# Patient Record
Sex: FEMALE | Race: WHITE | NOT HISPANIC OR LATINO
[De-identification: names, ages, dates, MRNs, and addresses within clinical notes are randomized per-mention and may not be internally consistent; named-entity substitution may affect disease eponyms.]

---

## 2017-01-06 ENCOUNTER — MEDICATION RENEWAL (OUTPATIENT)
Age: 52
End: 2017-01-06

## 2017-01-06 DIAGNOSIS — J06.9 ACUTE UPPER RESPIRATORY INFECTION, UNSPECIFIED: ICD-10-CM

## 2017-01-29 ENCOUNTER — MEDICATION RENEWAL (OUTPATIENT)
Age: 52
End: 2017-01-29

## 2017-01-30 ENCOUNTER — MEDICATION RENEWAL (OUTPATIENT)
Age: 52
End: 2017-01-30

## 2017-03-13 ENCOUNTER — MEDICATION RENEWAL (OUTPATIENT)
Age: 52
End: 2017-03-13

## 2017-03-25 ENCOUNTER — MEDICATION RENEWAL (OUTPATIENT)
Age: 52
End: 2017-03-25

## 2017-04-06 ENCOUNTER — MEDICATION RENEWAL (OUTPATIENT)
Age: 52
End: 2017-04-06

## 2017-05-07 ENCOUNTER — RESULT REVIEW (OUTPATIENT)
Age: 52
End: 2017-05-07

## 2017-05-08 ENCOUNTER — OTHER (OUTPATIENT)
Age: 52
End: 2017-05-08

## 2017-05-18 ENCOUNTER — APPOINTMENT (OUTPATIENT)
Dept: DERMATOLOGY | Facility: CLINIC | Age: 52
End: 2017-05-18

## 2017-05-18 VITALS — SYSTOLIC BLOOD PRESSURE: 128 MMHG | DIASTOLIC BLOOD PRESSURE: 70 MMHG

## 2017-05-31 ENCOUNTER — MEDICATION RENEWAL (OUTPATIENT)
Age: 52
End: 2017-05-31

## 2017-07-02 ENCOUNTER — MOBILE ON CALL (OUTPATIENT)
Age: 52
End: 2017-07-02

## 2017-07-03 ENCOUNTER — MEDICATION RENEWAL (OUTPATIENT)
Age: 52
End: 2017-07-03

## 2017-07-03 DIAGNOSIS — Z86.19 PERSONAL HISTORY OF OTHER INFECTIOUS AND PARASITIC DISEASES: ICD-10-CM

## 2017-08-04 ENCOUNTER — APPOINTMENT (OUTPATIENT)
Dept: INTERNAL MEDICINE | Facility: CLINIC | Age: 52
End: 2017-08-04
Payer: COMMERCIAL

## 2017-08-04 ENCOUNTER — NON-APPOINTMENT (OUTPATIENT)
Age: 52
End: 2017-08-04

## 2017-08-04 VITALS
TEMPERATURE: 98.4 F | WEIGHT: 166 LBS | OXYGEN SATURATION: 97 % | HEIGHT: 61 IN | SYSTOLIC BLOOD PRESSURE: 120 MMHG | HEART RATE: 73 BPM | BODY MASS INDEX: 31.34 KG/M2 | DIASTOLIC BLOOD PRESSURE: 64 MMHG

## 2017-08-04 DIAGNOSIS — Z81.8 FAMILY HISTORY OF OTHER MENTAL AND BEHAVIORAL DISORDERS: ICD-10-CM

## 2017-08-04 DIAGNOSIS — Z78.9 OTHER SPECIFIED HEALTH STATUS: ICD-10-CM

## 2017-08-04 PROCEDURE — 99386 PREV VISIT NEW AGE 40-64: CPT | Mod: 25

## 2017-08-04 PROCEDURE — 36415 COLL VENOUS BLD VENIPUNCTURE: CPT

## 2017-08-04 PROCEDURE — 93000 ELECTROCARDIOGRAM COMPLETE: CPT

## 2017-08-04 RX ORDER — ONDANSETRON 4 MG/1
4 TABLET ORAL 3 TIMES DAILY
Qty: 30 | Refills: 3 | Status: DISCONTINUED | COMMUNITY
Start: 2017-03-25 | End: 2017-08-04

## 2017-08-04 RX ORDER — GUAIFENESIN AND CODEINE PHOSPHATE 10; 100 MG/5ML; MG/5ML
100-10 SOLUTION ORAL
Qty: 120 | Refills: 0 | Status: DISCONTINUED | COMMUNITY
Start: 2017-01-06 | End: 2017-08-04

## 2017-08-09 LAB
ALBUMIN SERPL ELPH-MCNC: 4.2 G/DL
ALP BLD-CCNC: 76 U/L
ALT SERPL-CCNC: 26 U/L
ANION GAP SERPL CALC-SCNC: 17 MMOL/L
AST SERPL-CCNC: 24 U/L
BASOPHILS # BLD AUTO: 0.03 K/UL
BASOPHILS NFR BLD AUTO: 0.4 %
BILIRUB SERPL-MCNC: 0.2 MG/DL
BUN SERPL-MCNC: 16 MG/DL
CALCIUM SERPL-MCNC: 9.7 MG/DL
CHLORIDE SERPL-SCNC: 102 MMOL/L
CHOLEST SERPL-MCNC: 232 MG/DL
CHOLEST/HDLC SERPL: 3.9 RATIO
CO2 SERPL-SCNC: 22 MMOL/L
CREAT SERPL-MCNC: 0.64 MG/DL
EOSINOPHIL # BLD AUTO: 0.08 K/UL
EOSINOPHIL NFR BLD AUTO: 1.1 %
GLUCOSE SERPL-MCNC: 108 MG/DL
HBA1C MFR BLD HPLC: 6 %
HCT VFR BLD CALC: 40.7 %
HDLC SERPL-MCNC: 59 MG/DL
HGB BLD-MCNC: 13.5 G/DL
IMM GRANULOCYTES NFR BLD AUTO: 0 %
LDLC SERPL CALC-MCNC: 158 MG/DL
LYMPHOCYTES # BLD AUTO: 2.76 K/UL
LYMPHOCYTES NFR BLD AUTO: 38.7 %
MAN DIFF?: NORMAL
MCHC RBC-ENTMCNC: 28.7 PG
MCHC RBC-ENTMCNC: 33.2 GM/DL
MCV RBC AUTO: 86.4 FL
MONOCYTES # BLD AUTO: 0.39 K/UL
MONOCYTES NFR BLD AUTO: 5.5 %
NEUTROPHILS # BLD AUTO: 3.87 K/UL
NEUTROPHILS NFR BLD AUTO: 54.3 %
PLATELET # BLD AUTO: 274 K/UL
POTASSIUM SERPL-SCNC: 4.5 MMOL/L
PROT SERPL-MCNC: 7.3 G/DL
RBC # BLD: 4.71 M/UL
RBC # FLD: 13.9 %
SODIUM SERPL-SCNC: 141 MMOL/L
TRIGL SERPL-MCNC: 74 MG/DL
TSH SERPL-ACNC: 1.04 UIU/ML
WBC # FLD AUTO: 7.13 K/UL

## 2017-08-18 ENCOUNTER — RX RENEWAL (OUTPATIENT)
Age: 52
End: 2017-08-18

## 2017-08-21 ENCOUNTER — MEDICATION RENEWAL (OUTPATIENT)
Age: 52
End: 2017-08-21

## 2017-09-10 ENCOUNTER — MEDICATION RENEWAL (OUTPATIENT)
Age: 52
End: 2017-09-10

## 2017-10-07 ENCOUNTER — MOBILE ON CALL (OUTPATIENT)
Age: 52
End: 2017-10-07

## 2017-10-11 ENCOUNTER — MEDICATION RENEWAL (OUTPATIENT)
Age: 52
End: 2017-10-11

## 2017-10-19 ENCOUNTER — NON-APPOINTMENT (OUTPATIENT)
Age: 52
End: 2017-10-19

## 2017-10-19 ENCOUNTER — APPOINTMENT (OUTPATIENT)
Dept: CARDIOLOGY | Facility: CLINIC | Age: 52
End: 2017-10-19
Payer: COMMERCIAL

## 2017-10-19 ENCOUNTER — APPOINTMENT (OUTPATIENT)
Dept: CARDIOLOGY | Facility: CLINIC | Age: 52
End: 2017-10-19

## 2017-10-19 VITALS
WEIGHT: 166 LBS | HEIGHT: 61 IN | HEART RATE: 70 BPM | DIASTOLIC BLOOD PRESSURE: 60 MMHG | BODY MASS INDEX: 31.34 KG/M2 | SYSTOLIC BLOOD PRESSURE: 104 MMHG

## 2017-10-19 DIAGNOSIS — Z82.49 FAMILY HISTORY OF ISCHEMIC HEART DISEASE AND OTHER DISEASES OF THE CIRCULATORY SYSTEM: ICD-10-CM

## 2017-10-19 PROCEDURE — 99244 OFF/OP CNSLTJ NEW/EST MOD 40: CPT

## 2017-10-19 PROCEDURE — 93000 ELECTROCARDIOGRAM COMPLETE: CPT

## 2017-10-19 PROCEDURE — 36415 COLL VENOUS BLD VENIPUNCTURE: CPT

## 2017-10-20 PROBLEM — Z82.49 FAMILY HISTORY OF PERIPHERAL ARTERIAL DISEASE: Status: ACTIVE | Noted: 2017-10-20

## 2017-10-20 LAB
CHOLEST SERPL-MCNC: 273 MG/DL
CHOLEST/HDLC SERPL: 5.7 RATIO
HDLC SERPL-MCNC: 48 MG/DL
LDLC SERPL CALC-MCNC: 199 MG/DL
LDLC SERPL DIRECT ASSAY-MCNC: 212 MG/DL
TRIGL SERPL-MCNC: 131 MG/DL

## 2017-10-22 LAB — TSH SERPL-ACNC: 1.04 UIU/ML

## 2017-10-26 PROBLEM — Z00.00 ENCOUNTER FOR PREVENTIVE HEALTH EXAMINATION: Noted: 2017-10-26

## 2017-10-28 ENCOUNTER — MOBILE ON CALL (OUTPATIENT)
Age: 52
End: 2017-10-28

## 2017-11-09 ENCOUNTER — APPOINTMENT (OUTPATIENT)
Dept: CARDIOLOGY | Facility: CLINIC | Age: 52
End: 2017-11-09
Payer: COMMERCIAL

## 2017-11-09 PROCEDURE — 93320 DOPPLER ECHO COMPLETE: CPT

## 2017-11-09 PROCEDURE — 93325 DOPPLER ECHO COLOR FLOW MAPG: CPT

## 2017-11-09 PROCEDURE — 93351 STRESS TTE COMPLETE: CPT

## 2017-11-09 PROCEDURE — 93224 XTRNL ECG REC UP TO 48 HRS: CPT

## 2017-11-13 ENCOUNTER — NON-APPOINTMENT (OUTPATIENT)
Age: 52
End: 2017-11-13

## 2017-11-20 ENCOUNTER — APPOINTMENT (OUTPATIENT)
Dept: CARDIOLOGY | Facility: CLINIC | Age: 52
End: 2017-11-20
Payer: COMMERCIAL

## 2017-11-20 VITALS
HEART RATE: 82 BPM | HEIGHT: 61 IN | WEIGHT: 167 LBS | OXYGEN SATURATION: 95 % | DIASTOLIC BLOOD PRESSURE: 71 MMHG | TEMPERATURE: 98.4 F | SYSTOLIC BLOOD PRESSURE: 109 MMHG | BODY MASS INDEX: 31.53 KG/M2

## 2017-11-20 PROCEDURE — 93000 ELECTROCARDIOGRAM COMPLETE: CPT

## 2017-11-20 PROCEDURE — 99214 OFFICE O/P EST MOD 30 MIN: CPT

## 2017-11-21 ENCOUNTER — NON-APPOINTMENT (OUTPATIENT)
Age: 52
End: 2017-11-21

## 2017-12-19 ENCOUNTER — APPOINTMENT (OUTPATIENT)
Dept: CARDIOLOGY | Facility: CLINIC | Age: 52
End: 2017-12-19
Payer: COMMERCIAL

## 2017-12-19 ENCOUNTER — NON-APPOINTMENT (OUTPATIENT)
Age: 52
End: 2017-12-19

## 2017-12-19 VITALS
BODY MASS INDEX: 31.34 KG/M2 | DIASTOLIC BLOOD PRESSURE: 69 MMHG | HEART RATE: 74 BPM | WEIGHT: 166 LBS | SYSTOLIC BLOOD PRESSURE: 109 MMHG | OXYGEN SATURATION: 95 % | HEIGHT: 61 IN

## 2017-12-19 PROCEDURE — 99214 OFFICE O/P EST MOD 30 MIN: CPT

## 2017-12-19 PROCEDURE — 93000 ELECTROCARDIOGRAM COMPLETE: CPT

## 2018-01-17 ENCOUNTER — MEDICATION RENEWAL (OUTPATIENT)
Age: 53
End: 2018-01-17

## 2018-02-02 ENCOUNTER — MEDICATION RENEWAL (OUTPATIENT)
Age: 53
End: 2018-02-02

## 2018-02-14 ENCOUNTER — APPOINTMENT (OUTPATIENT)
Dept: GASTROENTEROLOGY | Facility: CLINIC | Age: 53
End: 2018-02-14

## 2018-04-03 ENCOUNTER — APPOINTMENT (OUTPATIENT)
Dept: CARDIOLOGY | Facility: CLINIC | Age: 53
End: 2018-04-03
Payer: COMMERCIAL

## 2018-04-03 ENCOUNTER — NON-APPOINTMENT (OUTPATIENT)
Age: 53
End: 2018-04-03

## 2018-04-03 VITALS
BODY MASS INDEX: 30.04 KG/M2 | HEART RATE: 70 BPM | OXYGEN SATURATION: 9 % | WEIGHT: 159 LBS | SYSTOLIC BLOOD PRESSURE: 130 MMHG | DIASTOLIC BLOOD PRESSURE: 60 MMHG

## 2018-04-03 PROCEDURE — 99214 OFFICE O/P EST MOD 30 MIN: CPT

## 2018-04-03 PROCEDURE — 36415 COLL VENOUS BLD VENIPUNCTURE: CPT

## 2018-04-03 PROCEDURE — 93000 ELECTROCARDIOGRAM COMPLETE: CPT

## 2018-04-04 LAB
ALBUMIN SERPL ELPH-MCNC: 4.4 G/DL
ALP BLD-CCNC: 60 U/L
ALT SERPL-CCNC: 14 U/L
ANION GAP SERPL CALC-SCNC: 17 MMOL/L
AST SERPL-CCNC: 22 U/L
BILIRUB SERPL-MCNC: 0.2 MG/DL
BUN SERPL-MCNC: 20 MG/DL
CALCIUM SERPL-MCNC: 9.6 MG/DL
CHLORIDE SERPL-SCNC: 99 MMOL/L
CHOLEST SERPL-MCNC: 174 MG/DL
CHOLEST/HDLC SERPL: 3.5 RATIO
CO2 SERPL-SCNC: 23 MMOL/L
CREAT SERPL-MCNC: 0.71 MG/DL
GLUCOSE SERPL-MCNC: 127 MG/DL
HDLC SERPL-MCNC: 50 MG/DL
LDLC SERPL CALC-MCNC: 97 MG/DL
LDLC SERPL DIRECT ASSAY-MCNC: 102 MG/DL
POTASSIUM SERPL-SCNC: 3.9 MMOL/L
PROT SERPL-MCNC: 7.6 G/DL
SODIUM SERPL-SCNC: 139 MMOL/L
TRIGL SERPL-MCNC: 136 MG/DL

## 2018-04-14 ENCOUNTER — MEDICATION RENEWAL (OUTPATIENT)
Age: 53
End: 2018-04-14

## 2018-04-27 ENCOUNTER — RX RENEWAL (OUTPATIENT)
Age: 53
End: 2018-04-27

## 2018-04-30 ENCOUNTER — RX RENEWAL (OUTPATIENT)
Age: 53
End: 2018-04-30

## 2018-05-22 ENCOUNTER — CLINICAL ADVICE (OUTPATIENT)
Age: 53
End: 2018-05-22

## 2018-06-04 ENCOUNTER — APPOINTMENT (OUTPATIENT)
Dept: GASTROENTEROLOGY | Facility: CLINIC | Age: 53
End: 2018-06-04
Payer: COMMERCIAL

## 2018-06-04 VITALS
RESPIRATION RATE: 14 BRPM | HEIGHT: 61 IN | BODY MASS INDEX: 29.64 KG/M2 | HEART RATE: 74 BPM | TEMPERATURE: 97.8 F | OXYGEN SATURATION: 96 % | SYSTOLIC BLOOD PRESSURE: 118 MMHG | DIASTOLIC BLOOD PRESSURE: 64 MMHG | WEIGHT: 157 LBS

## 2018-06-04 DIAGNOSIS — Z80.0 FAMILY HISTORY OF MALIGNANT NEOPLASM OF DIGESTIVE ORGANS: ICD-10-CM

## 2018-06-04 PROCEDURE — 99243 OFF/OP CNSLTJ NEW/EST LOW 30: CPT

## 2018-06-15 ENCOUNTER — APPOINTMENT (OUTPATIENT)
Dept: GASTROENTEROLOGY | Facility: AMBULATORY MEDICAL SERVICES | Age: 53
End: 2018-06-15
Payer: COMMERCIAL

## 2018-06-15 PROCEDURE — 45380 COLONOSCOPY AND BIOPSY: CPT | Mod: 59,33

## 2018-06-15 PROCEDURE — 45385 COLONOSCOPY W/LESION REMOVAL: CPT | Mod: 33

## 2018-06-30 ENCOUNTER — MEDICATION RENEWAL (OUTPATIENT)
Age: 53
End: 2018-06-30

## 2018-07-04 ENCOUNTER — OTHER (OUTPATIENT)
Age: 53
End: 2018-07-04

## 2018-08-01 ENCOUNTER — MOBILE ON CALL (OUTPATIENT)
Age: 53
End: 2018-08-01

## 2018-08-27 ENCOUNTER — APPOINTMENT (OUTPATIENT)
Dept: ORTHOPEDIC SURGERY | Facility: CLINIC | Age: 53
End: 2018-08-27
Payer: COMMERCIAL

## 2018-08-27 VITALS
HEIGHT: 61 IN | HEART RATE: 84 BPM | BODY MASS INDEX: 29.83 KG/M2 | SYSTOLIC BLOOD PRESSURE: 112 MMHG | DIASTOLIC BLOOD PRESSURE: 72 MMHG | WEIGHT: 158 LBS

## 2018-08-27 DIAGNOSIS — Z86.39 PERSONAL HISTORY OF OTHER ENDOCRINE, NUTRITIONAL AND METABOLIC DISEASE: ICD-10-CM

## 2018-08-27 DIAGNOSIS — Z78.9 OTHER SPECIFIED HEALTH STATUS: ICD-10-CM

## 2018-08-27 PROCEDURE — 99203 OFFICE O/P NEW LOW 30 MIN: CPT

## 2018-08-27 PROCEDURE — 73030 X-RAY EXAM OF SHOULDER: CPT | Mod: LT

## 2018-09-02 ENCOUNTER — OTHER (OUTPATIENT)
Age: 53
End: 2018-09-02

## 2018-10-02 ENCOUNTER — APPOINTMENT (OUTPATIENT)
Dept: CARDIOLOGY | Facility: CLINIC | Age: 53
End: 2018-10-02

## 2018-10-05 ENCOUNTER — OTHER (OUTPATIENT)
Age: 53
End: 2018-10-05

## 2018-10-08 ENCOUNTER — MEDICATION RENEWAL (OUTPATIENT)
Age: 53
End: 2018-10-08

## 2018-10-15 ENCOUNTER — MEDICATION RENEWAL (OUTPATIENT)
Age: 53
End: 2018-10-15

## 2018-12-10 ENCOUNTER — MEDICATION RENEWAL (OUTPATIENT)
Age: 53
End: 2018-12-10

## 2018-12-12 ENCOUNTER — CXD DOCUMENT (OUTPATIENT)
Age: 53
End: 2018-12-12

## 2019-01-16 ENCOUNTER — MEDICATION RENEWAL (OUTPATIENT)
Age: 54
End: 2019-01-16

## 2019-01-21 ENCOUNTER — MEDICATION RENEWAL (OUTPATIENT)
Age: 54
End: 2019-01-21

## 2019-01-24 ENCOUNTER — OTHER (OUTPATIENT)
Age: 54
End: 2019-01-24

## 2019-01-24 DIAGNOSIS — J01.90 ACUTE SINUSITIS, UNSPECIFIED: ICD-10-CM

## 2019-01-26 PROBLEM — J01.90 ACUTE SINUSITIS: Status: RESOLVED | Noted: 2019-01-26 | Resolved: 2019-02-02

## 2019-02-15 ENCOUNTER — OTHER (OUTPATIENT)
Age: 54
End: 2019-02-15

## 2019-02-24 ENCOUNTER — MEDICATION RENEWAL (OUTPATIENT)
Age: 54
End: 2019-02-24

## 2019-04-17 ENCOUNTER — MEDICATION RENEWAL (OUTPATIENT)
Age: 54
End: 2019-04-17

## 2019-04-18 ENCOUNTER — MEDICATION RENEWAL (OUTPATIENT)
Age: 54
End: 2019-04-18

## 2019-05-17 ENCOUNTER — CLINICAL ADVICE (OUTPATIENT)
Age: 54
End: 2019-05-17

## 2019-06-07 ENCOUNTER — OTHER (OUTPATIENT)
Age: 54
End: 2019-06-07

## 2019-07-01 ENCOUNTER — MEDICATION RENEWAL (OUTPATIENT)
Age: 54
End: 2019-07-01

## 2019-07-15 ENCOUNTER — MEDICATION RENEWAL (OUTPATIENT)
Age: 54
End: 2019-07-15

## 2019-08-14 ENCOUNTER — MEDICATION RENEWAL (OUTPATIENT)
Age: 54
End: 2019-08-14

## 2019-10-01 ENCOUNTER — OTHER (OUTPATIENT)
Age: 54
End: 2019-10-01

## 2019-10-02 ENCOUNTER — MESSAGE (OUTPATIENT)
Age: 54
End: 2019-10-02

## 2019-10-04 ENCOUNTER — APPOINTMENT (OUTPATIENT)
Dept: INTERNAL MEDICINE | Facility: CLINIC | Age: 54
End: 2019-10-04
Payer: COMMERCIAL

## 2019-10-04 VITALS
SYSTOLIC BLOOD PRESSURE: 132 MMHG | OXYGEN SATURATION: 96 % | DIASTOLIC BLOOD PRESSURE: 60 MMHG | HEIGHT: 61 IN | BODY MASS INDEX: 30.58 KG/M2 | WEIGHT: 162 LBS | TEMPERATURE: 98.7 F | HEART RATE: 83 BPM

## 2019-10-04 PROCEDURE — 99396 PREV VISIT EST AGE 40-64: CPT | Mod: 25

## 2019-10-04 PROCEDURE — 36415 COLL VENOUS BLD VENIPUNCTURE: CPT

## 2019-10-04 RX ORDER — BACLOFEN 10 MG/1
10 TABLET ORAL 3 TIMES DAILY
Qty: 30 | Refills: 0 | Status: DISCONTINUED | COMMUNITY
Start: 2017-09-10 | End: 2019-10-04

## 2019-10-06 RX ORDER — MOXIFLOXACIN HYDROCHLORIDE TABLETS, 400 MG 400 MG/1
400 TABLET, FILM COATED ORAL
Qty: 7 | Refills: 0 | Status: DISCONTINUED | COMMUNITY
Start: 2019-01-26 | End: 2019-10-06

## 2019-10-06 RX ORDER — CLINDAMYCIN PHOSPHATE 10 MG/ML
1 LOTION TOPICAL
Qty: 2 | Refills: 4 | Status: DISCONTINUED | COMMUNITY
Start: 2017-05-18 | End: 2019-10-06

## 2019-10-06 RX ORDER — CHLORHEXIDINE GLUCONATE 4 G/100ML
4 SOLUTION TOPICAL
Qty: 1 | Refills: 0 | Status: DISCONTINUED | COMMUNITY
Start: 2017-05-18 | End: 2019-10-06

## 2019-10-06 RX ORDER — METHOCARBAMOL 500 MG/1
500 TABLET, FILM COATED ORAL 3 TIMES DAILY
Qty: 30 | Refills: 0 | Status: DISCONTINUED | COMMUNITY
Start: 2018-04-30 | End: 2019-10-06

## 2019-10-06 RX ORDER — CIPROFLOXACIN HYDROCHLORIDE 500 MG/1
500 TABLET, FILM COATED ORAL
Qty: 6 | Refills: 0 | Status: DISCONTINUED | COMMUNITY
Start: 2018-02-02 | End: 2019-10-06

## 2019-10-06 RX ORDER — POLYETHYLENE GLYCOL 3350, SODIUM SULFATE, SODIUM CHLORIDE, POTASSIUM CHLORIDE, ASCORBIC ACID, SODIUM ASCORBATE 7.5-2.691G
100 KIT ORAL
Qty: 1 | Refills: 0 | Status: DISCONTINUED | COMMUNITY
Start: 2018-06-04 | End: 2019-10-06

## 2019-10-06 RX ORDER — HYDROXYUREA 500 MG/1
500 CAPSULE ORAL
Qty: 60 | Refills: 3 | Status: DISCONTINUED | COMMUNITY
Start: 2017-10-11 | End: 2019-10-06

## 2019-10-06 RX ORDER — ROSUVASTATIN CALCIUM 5 MG/1
TABLET, FILM COATED ORAL
Refills: 0 | Status: DISCONTINUED | COMMUNITY
End: 2019-10-06

## 2019-10-06 RX ORDER — NAPROXEN 500 MG/1
TABLET ORAL
Refills: 0 | Status: DISCONTINUED | COMMUNITY
End: 2019-10-06

## 2019-10-06 RX ORDER — SERTRALINE HYDROCHLORIDE 25 MG/1
TABLET, FILM COATED ORAL
Refills: 0 | Status: DISCONTINUED | COMMUNITY
End: 2019-10-06

## 2019-10-06 NOTE — REVIEW OF SYSTEMS
[Joint Pain] : joint pain [Insomnia] : insomnia [Suicidal] : not suicidal [Muscle Pain] : muscle pain [Depression] : depression [Anxiety] : no anxiety [Negative] : Integumentary [FreeTextEntry9] : denies joint swelling, morning stiffness, no involvement of small joints.

## 2019-10-06 NOTE — REVIEW OF SYSTEMS
[Joint Pain] : joint pain [Suicidal] : not suicidal [Insomnia] : insomnia [Muscle Pain] : muscle pain [Anxiety] : no anxiety [Depression] : depression [Negative] : Integumentary [FreeTextEntry9] : denies joint swelling, morning stiffness, no involvement of small joints.

## 2019-10-06 NOTE — COUNSELING
[Risk of tobacco use and health benefits of smoking cessation discussed] : Risk of tobacco use and health benefits of smoking cessation discussed [Use of nicotine replacement therapies and other medications discussed] : Use of nicotine replacement therapies and other medications discussed

## 2019-10-06 NOTE — HISTORY OF PRESENT ILLNESS
[FreeTextEntry1] : CPE [de-identified] : Seeing PT 3x a week for vasrious joints, muscle tightness.    Uses tretching and occ motrin.  Has cut back on exercise significantly since joint pains, was hoping PT would help.  Also trying cupping, which sometimes helps at the end of the week.\par \par Dad passed in May.  Continues to take zoloft 300 and xanax for sleep.  However, sleep is not great, wakes up frequently during the night.  Did not like ambien side effects, has added melatonin.   denies SI. Is not interested in talk therapy.  Would like to continue zoloft at current dose.  \par \par Using terbinafine intermittently.\par \par Has nicotrol at home, is precontemplative for tobacco cessation.  \par \par HCM:  up to date on colon cancer screening.\par Is not interested in pneumococcal vaccination (tobacco history).\par \par

## 2019-10-06 NOTE — PHYSICAL EXAM
[No Acute Distress] : no acute distress [Normal TMs] : both tympanic membranes were normal [Supple] : supple [No Respiratory Distress] : no respiratory distress  [Clear to Auscultation] : lungs were clear to auscultation bilaterally [Regular Rhythm] : with a regular rhythm [Normal Rate] : normal rate  [Normal S1, S2] : normal S1 and S2 [No Edema] : there was no peripheral edema [Soft] : abdomen soft [Non Tender] : non-tender [Normal Supraclavicular Nodes] : no supraclavicular lymphadenopathy [Normal Anterior Cervical Nodes] : no anterior cervical lymphadenopathy [No Joint Swelling] : no joint swelling

## 2019-10-06 NOTE — PHYSICAL EXAM
[No Acute Distress] : no acute distress [Normal TMs] : both tympanic membranes were normal [Supple] : supple [Clear to Auscultation] : lungs were clear to auscultation bilaterally [No Respiratory Distress] : no respiratory distress  [Regular Rhythm] : with a regular rhythm [Normal Rate] : normal rate  [No Edema] : there was no peripheral edema [Normal S1, S2] : normal S1 and S2 [Soft] : abdomen soft [Non Tender] : non-tender [Normal Supraclavicular Nodes] : no supraclavicular lymphadenopathy [Normal Anterior Cervical Nodes] : no anterior cervical lymphadenopathy [No Joint Swelling] : no joint swelling

## 2019-10-06 NOTE — HISTORY OF PRESENT ILLNESS
[FreeTextEntry1] : CPE [de-identified] : Seeing PT 3x a week for vasrious joints, muscle tightness.    Uses tretching and occ motrin.  Has cut back on exercise significantly since joint pains, was hoping PT would help.  Also trying cupping, which sometimes helps at the end of the week.\par \par Dad passed in May.  Continues to take zoloft 300 and xanax for sleep.  However, sleep is not great, wakes up frequently during the night.  Did not like ambien side effects, has added melatonin.   denies SI. Is not interested in talk therapy.  Would like to continue zoloft at current dose.  \par \par Using terbinafine intermittently.\par \par Has nicotrol at home, is precontemplative for tobacco cessation.  \par \par HCM:  up to date on colon cancer screening.\par Is not interested in pneumococcal vaccination (tobacco history).\par \par

## 2019-10-07 LAB
ALBUMIN SERPL ELPH-MCNC: 4.6 G/DL
ALP BLD-CCNC: 74 U/L
ALT SERPL-CCNC: 19 U/L
ANION GAP SERPL CALC-SCNC: 17 MMOL/L
AST SERPL-CCNC: 18 U/L
BASOPHILS # BLD AUTO: 0.06 K/UL
BASOPHILS NFR BLD AUTO: 0.8 %
BILIRUB SERPL-MCNC: 0.2 MG/DL
BUN SERPL-MCNC: 13 MG/DL
CALCIUM SERPL-MCNC: 9.3 MG/DL
CHLORIDE SERPL-SCNC: 102 MMOL/L
CHOLEST SERPL-MCNC: 251 MG/DL
CHOLEST/HDLC SERPL: 5.1 RATIO
CO2 SERPL-SCNC: 21 MMOL/L
CREAT SERPL-MCNC: 0.59 MG/DL
EOSINOPHIL # BLD AUTO: 0.11 K/UL
EOSINOPHIL NFR BLD AUTO: 1.4 %
ESTIMATED AVERAGE GLUCOSE: 123 MG/DL
GLUCOSE SERPL-MCNC: 107 MG/DL
HBA1C MFR BLD HPLC: 5.9 %
HCT VFR BLD CALC: 41.7 %
HDLC SERPL-MCNC: 49 MG/DL
HGB BLD-MCNC: 13.7 G/DL
IMM GRANULOCYTES NFR BLD AUTO: 0.1 %
LDLC SERPL CALC-MCNC: 167 MG/DL
LYMPHOCYTES # BLD AUTO: 1.67 K/UL
LYMPHOCYTES NFR BLD AUTO: 21 %
MAN DIFF?: NORMAL
MCHC RBC-ENTMCNC: 29.6 PG
MCHC RBC-ENTMCNC: 32.9 GM/DL
MCV RBC AUTO: 90.1 FL
MONOCYTES # BLD AUTO: 0.5 K/UL
MONOCYTES NFR BLD AUTO: 6.3 %
NEUTROPHILS # BLD AUTO: 5.59 K/UL
NEUTROPHILS NFR BLD AUTO: 70.4 %
PLATELET # BLD AUTO: 234 K/UL
POTASSIUM SERPL-SCNC: 4.2 MMOL/L
PROT SERPL-MCNC: 7.1 G/DL
RBC # BLD: 4.63 M/UL
RBC # FLD: 13.3 %
SODIUM SERPL-SCNC: 140 MMOL/L
TRIGL SERPL-MCNC: 176 MG/DL
TSH SERPL-ACNC: 0.68 UIU/ML
WBC # FLD AUTO: 7.94 K/UL

## 2019-10-08 ENCOUNTER — TRANSCRIPTION ENCOUNTER (OUTPATIENT)
Age: 54
End: 2019-10-08

## 2019-10-17 ENCOUNTER — MEDICATION RENEWAL (OUTPATIENT)
Age: 54
End: 2019-10-17

## 2019-11-04 ENCOUNTER — MEDICATION RENEWAL (OUTPATIENT)
Age: 54
End: 2019-11-04

## 2019-11-05 ENCOUNTER — RESULT REVIEW (OUTPATIENT)
Age: 54
End: 2019-11-05

## 2019-12-16 ENCOUNTER — MEDICATION RENEWAL (OUTPATIENT)
Age: 54
End: 2019-12-16

## 2020-04-09 ENCOUNTER — EMERGENCY (EMERGENCY)
Facility: HOSPITAL | Age: 55
LOS: 1 days | Discharge: HOME | End: 2020-04-09
Attending: EMERGENCY MEDICINE
Payer: COMMERCIAL

## 2020-04-09 VITALS
DIASTOLIC BLOOD PRESSURE: 71 MMHG | RESPIRATION RATE: 18 BRPM | OXYGEN SATURATION: 95 % | HEART RATE: 66 BPM | WEIGHT: 141.1 LBS | SYSTOLIC BLOOD PRESSURE: 122 MMHG | TEMPERATURE: 100 F | HEIGHT: 64 IN

## 2020-04-09 LAB — SARS-COV-2 RNA SPEC QL NAA+PROBE: DETECTED

## 2020-04-09 PROCEDURE — 99284 EMERGENCY DEPT VISIT MOD MDM: CPT

## 2020-04-09 PROCEDURE — 99283 EMERGENCY DEPT VISIT LOW MDM: CPT

## 2020-04-09 PROCEDURE — 87635 SARS-COV-2 COVID-19 AMP PRB: CPT

## 2020-04-09 NOTE — ED ADULT TRIAGE NOTE - CHIEF COMPLAINT QUOTE
fatigue and generalized weakness x 5 days; exposed to covid patients @ work; denies cough; also c/o ha

## 2020-04-09 NOTE — ED ADULT NURSE NOTE - OBJECTIVE STATEMENT
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

## 2020-04-09 NOTE — ED PROVIDER NOTE - NSFOLLOWUPINSTRUCTIONS_ED_ALL_ED_FT
Please follow up with your primary care doctor.  You will receive COVID testing results on your phone once resulted.   Return to the Emergency Department for any new or worsening symptoms

## 2020-04-09 NOTE — DISCUSSION/SUMMARY
[ED] : a call from ED [FreeTextEntry1] : Seen in ED with weakness, exam nomral, COVID testing pending.

## 2020-04-09 NOTE — ED PROVIDER NOTE - PATIENT PORTAL LINK FT
You can access the FollowMyHealth Patient Portal offered by [facility] by registering at the following website: [email or web address]. By joining [employer]s FollowMyHealth portal, you will also be able to view your health information using other applications (apps) compatible with our system.

## 2020-04-09 NOTE — ED PROVIDER NOTE - CLINICAL SUMMARY MEDICAL DECISION MAKING FREE TEXT BOX
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 Patient well appearing, will test and discharge with strict return precautions.

## 2020-04-09 NOTE — ED PROVIDER NOTE - OBJECTIVE STATEMENT
54F with no pmh presenting with 1 week hx of fatigue. 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 Today noted slightly scratchy throat. Denies fever, chills, cp, sob, nausea, vomiting 54F with no pmh presenting with 1 week hx of fatigue. 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 Today noted slightly scratchy throat. Denies fever, chills, cp, sob, nausea, vomiting

## 2020-04-09 NOTE — ED PROVIDER NOTE - ATTENDING CONTRIBUTION TO CARE
54F, no sig pmh, ANM at [facility] [employer], smoker, presents with fatigue, sore throat, chills x 1 week. 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 no fever. no cp, sob, abd pain, n/v/d. otherwise feels well.    PE: NAD, NCAT, MMM, Trachea midline, Normal conjunctiva, lungs CTAB, S1/S2 RRR, Normal perfusion, 2+ radial pulses bilat, Abdomen Soft, NTND, No rebound/guarding, No LE edema, No deformity of extremities, No rashes,  No focal motor or sensory deficits.     The patient does not have high-risk features of a life-threatening URI infection (COVID or otherwise). There is no severe shortness of breath, light-headedness, or inability to perform activities of daily living that would indicate impending respiratory failure. VS were without sig abnormality. COVID swab was sent as patient is an employee.    Myself and/or the RN has had a long conversation with the patient regarding the reasons to return to the Emergency Department including but not limited to: worsening cough, shortness of breath, syncope, near-syncope, chest pain or any other concerns.  Patient was counseled extensively on self-quarantine protocol, hand washing, covering cough, cleansing of regularly used surfaces, return precautions, and supportive care measures to be taken.     - Steven Barney MD

## 2020-04-09 NOTE — ED PROVIDER NOTE - NS ED ROS FT
GENERAL: fatigue, No fever or chills  EYES: no change in vision  HEENT: no trouble swallowing or speaking  CARDIAC: no chest pain or palpitations  PULMONARY: no cough or SOB  GI: no abdominal pain, nausea, vomiting, diarrhea, or constipation   : No changes in urination  SKIN: no rashes  NEURO: no headache, numbness, or weakness  MSK: No joint pain

## 2020-04-25 ENCOUNTER — MESSAGE (OUTPATIENT)
Age: 55
End: 2020-04-25

## 2020-05-04 LAB
SARS-COV-2 IGG SERPL IA-ACNC: 3.5 INDEX
SARS-COV-2 IGG SERPL QL IA: POSITIVE

## 2020-09-15 DIAGNOSIS — Z11.59 ENCOUNTER FOR SCREENING FOR OTHER VIRAL DISEASES: ICD-10-CM

## 2020-09-18 DIAGNOSIS — N39.0 URINARY TRACT INFECTION, SITE NOT SPECIFIED: ICD-10-CM

## 2020-09-18 LAB — SARS-COV-2 N GENE NPH QL NAA+PROBE: NOT DETECTED

## 2020-10-10 LAB — SARS-COV-2 N GENE NPH QL NAA+PROBE: NOT DETECTED

## 2020-12-15 PROBLEM — Z86.19 HISTORY OF CANDIDIASIS OF VAGINA: Status: RESOLVED | Noted: 2017-07-05 | Resolved: 2020-12-15

## 2020-12-23 PROBLEM — N39.0 ACUTE UTI: Status: RESOLVED | Noted: 2018-02-02 | Resolved: 2020-12-23

## 2021-01-20 ENCOUNTER — RESULT REVIEW (OUTPATIENT)
Age: 56
End: 2021-01-20

## 2021-04-25 DIAGNOSIS — Z11.59 ENCOUNTER FOR SCREENING FOR OTHER VIRAL DISEASES: ICD-10-CM

## 2021-05-18 LAB — SARS-COV-2 N GENE NPH QL NAA+PROBE: NOT DETECTED

## 2021-05-29 ENCOUNTER — NON-APPOINTMENT (OUTPATIENT)
Age: 56
End: 2021-05-29

## 2021-06-07 ENCOUNTER — APPOINTMENT (OUTPATIENT)
Dept: INTERNAL MEDICINE | Facility: CLINIC | Age: 56
End: 2021-06-07
Payer: COMMERCIAL

## 2021-06-07 VITALS
WEIGHT: 160 LBS | HEIGHT: 61 IN | SYSTOLIC BLOOD PRESSURE: 120 MMHG | DIASTOLIC BLOOD PRESSURE: 65 MMHG | TEMPERATURE: 98 F | BODY MASS INDEX: 30.21 KG/M2 | OXYGEN SATURATION: 97 % | HEART RATE: 76 BPM

## 2021-06-07 DIAGNOSIS — Z87.898 PERSONAL HISTORY OF OTHER SPECIFIED CONDITIONS: ICD-10-CM

## 2021-06-07 DIAGNOSIS — Z87.19 PERSONAL HISTORY OF OTHER DISEASES OF THE DIGESTIVE SYSTEM: ICD-10-CM

## 2021-06-07 DIAGNOSIS — Z11.59 ENCOUNTER FOR SCREENING FOR OTHER VIRAL DISEASES: ICD-10-CM

## 2021-06-07 DIAGNOSIS — L85.3 XEROSIS CUTIS: ICD-10-CM

## 2021-06-07 DIAGNOSIS — I49.3 VENTRICULAR PREMATURE DEPOLARIZATION: ICD-10-CM

## 2021-06-07 DIAGNOSIS — Z87.09 PERSONAL HISTORY OF OTHER DISEASES OF THE RESPIRATORY SYSTEM: ICD-10-CM

## 2021-06-07 DIAGNOSIS — Z87.2 PERSONAL HISTORY OF DISEASES OF THE SKIN AND SUBCUTANEOUS TISSUE: ICD-10-CM

## 2021-06-07 DIAGNOSIS — Z87.39 PERSONAL HISTORY OF OTHER DISEASES OF THE MUSCULOSKELETAL SYSTEM AND CONNECTIVE TISSUE: ICD-10-CM

## 2021-06-07 DIAGNOSIS — M24.551 CONTRACTURE, RIGHT HIP: ICD-10-CM

## 2021-06-07 DIAGNOSIS — Z86.19 PERSONAL HISTORY OF OTHER INFECTIOUS AND PARASITIC DISEASES: ICD-10-CM

## 2021-06-07 DIAGNOSIS — Z12.11 ENCOUNTER FOR SCREENING FOR MALIGNANT NEOPLASM OF COLON: ICD-10-CM

## 2021-06-07 DIAGNOSIS — M25.559 PAIN IN UNSPECIFIED HIP: ICD-10-CM

## 2021-06-07 DIAGNOSIS — M75.82 OTHER SHOULDER LESIONS, LEFT SHOULDER: ICD-10-CM

## 2021-06-07 PROCEDURE — 36415 COLL VENOUS BLD VENIPUNCTURE: CPT

## 2021-06-07 PROCEDURE — 99396 PREV VISIT EST AGE 40-64: CPT | Mod: 25

## 2021-06-07 PROCEDURE — G0296 VISIT TO DETERM LDCT ELIG: CPT | Mod: NC

## 2021-06-07 PROCEDURE — 99072 ADDL SUPL MATRL&STAF TM PHE: CPT

## 2021-06-07 RX ORDER — CIPROFLOXACIN HYDROCHLORIDE 500 MG/1
500 TABLET, FILM COATED ORAL
Qty: 14 | Refills: 0 | Status: DISCONTINUED | COMMUNITY
Start: 2020-11-27 | End: 2021-06-07

## 2021-06-07 RX ORDER — CYCLOBENZAPRINE HYDROCHLORIDE 10 MG/1
10 TABLET, FILM COATED ORAL
Qty: 60 | Refills: 3 | Status: DISCONTINUED | COMMUNITY
Start: 2018-08-01 | End: 2021-06-07

## 2021-06-07 RX ORDER — NITROFURANTOIN MACROCRYSTALS 100 MG/1
100 CAPSULE ORAL
Qty: 10 | Refills: 0 | Status: DISCONTINUED | COMMUNITY
Start: 2020-09-18 | End: 2021-06-07

## 2021-06-07 RX ORDER — METOPROLOL SUCCINATE 25 MG/1
25 TABLET, EXTENDED RELEASE ORAL
Qty: 180 | Refills: 3 | Status: DISCONTINUED | COMMUNITY
Start: 2017-11-20 | End: 2021-06-07

## 2021-06-07 RX ORDER — FLUTICASONE PROPIONATE 50 UG/1
50 SPRAY, METERED NASAL DAILY
Qty: 3 | Refills: 3 | Status: DISCONTINUED | COMMUNITY
Start: 2019-07-01 | End: 2021-06-07

## 2021-06-07 RX ORDER — TERBINAFINE HYDROCHLORIDE 250 MG/1
250 TABLET ORAL DAILY
Qty: 30 | Refills: 1 | Status: DISCONTINUED | COMMUNITY
Start: 2017-03-25 | End: 2021-06-07

## 2021-06-07 RX ORDER — PROPRANOLOL HYDROCHLORIDE 60 MG/1
60 CAPSULE, EXTENDED RELEASE ORAL
Qty: 7 | Refills: 1 | Status: DISCONTINUED | COMMUNITY
Start: 2017-10-07 | End: 2021-06-07

## 2021-06-07 RX ORDER — LINACLOTIDE 290 UG/1
290 CAPSULE, GELATIN COATED ORAL
Qty: 30 | Refills: 1 | Status: DISCONTINUED | COMMUNITY
Start: 2018-06-04 | End: 2021-06-07

## 2021-06-07 RX ORDER — OSELTAMIVIR PHOSPHATE 75 MG/1
75 CAPSULE ORAL
Qty: 10 | Refills: 0 | Status: DISCONTINUED | COMMUNITY
Start: 2019-12-16 | End: 2021-06-07

## 2021-06-07 RX ORDER — BUPROPION HYDROCHLORIDE 150 MG/1
150 TABLET, EXTENDED RELEASE ORAL DAILY
Qty: 90 | Refills: 3 | Status: DISCONTINUED | COMMUNITY
Start: 2020-06-24 | End: 2021-06-07

## 2021-06-07 RX ORDER — FLUCONAZOLE 150 MG/1
150 TABLET ORAL WEEKLY
Qty: 24 | Refills: 0 | Status: DISCONTINUED | COMMUNITY
Start: 2020-05-12 | End: 2021-06-07

## 2021-06-07 NOTE — ASSESSMENT
[FreeTextEntry1] : 54 y/o female here for CPE,\par \par Depression x many years.  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  Now with increase in crying, difficulty with complex situations at work, some nightmares. No SI.   On sertraline 300 and alprazolam at night.  Discussed at length.  Recent symptoms with lack of focus, tearing, etc may be secondary to acute exacerbation of her depression and anxiety and or post traumatic stress from her COVID experience.  For now, we discussed and agreed upon starting talk therapy, and if needed in the future, will consider a new psychiatry evaluation based on her symptoms\par -will continue sertaline 300 daily\par -alprazolam 0.5mg qhs\par -Advised that these precriptions should be managed by me for the time being and we discussed the several ways she can reach me should she need refills or have worsening of her symptoms\par -refer for care coordination to [facility]\par \par \par Tobacco use:  advised cessation, however, would prefer to focus on her emotional health first.  Mrs. Arnold had been able to successfully quit before COVID with a behavioral support delilah from [employer]/\par Eligible for Lung Cancer screening, 1ppd since x 30+ years, we discussed the risk and benefits of this and patient wishes to proceed.  \par \par Anxiety/Insomnia: xanax 0.5 mg at night \par \par HLD:  will check lipids today, has not been taking statin regularly\par \par HCM: \par -labs as noted\par -declines pneumovax at this visit\par -COVID vaccines completed\par -mammo up to date as per patient. \par -CLS due 2023\par -PAP up to date

## 2021-06-07 NOTE — PHYSICAL EXAM
[No Acute Distress] : no acute distress [Normal Sclera/Conjunctiva] : normal sclera/conjunctiva [Supple] : supple [No Respiratory Distress] : no respiratory distress  [Clear to Auscultation] : lungs were clear to auscultation bilaterally [Normal Rate] : normal rate  [Regular Rhythm] : with a regular rhythm [Normal S1, S2] : normal S1 and S2 [No Murmur] : no murmur heard [No Edema] : there was no peripheral edema [Soft] : abdomen soft [Non Tender] : non-tender [Normal Affect] : the affect was normal [Normal Mood] : the mood was normal [Normal Insight/Judgement] : insight and judgment were intact [de-identified] : tearful at times discussing her COVID experience

## 2021-06-07 NOTE — HISTORY OF PRESENT ILLNESS
[de-identified] : 56 y/o female for CPE\par \par Main concern today is problems with processing and handling information.  Reports she notices herself "freezing" at work, finding it difficult to process multiple streams of information at the same time, frequently physically slow to reply to colleagues even when she knows what to say.  Finds herself tearful at times as well, even in times when she is not stressed.\par \mikael Continues on zoloft 300 daily,  her  prescribed welbutrin as an additional medication, she took for a month, it did not help, so she stopped it.  She uses alprazolam 0.5 qhs at night, does not like to use it during the day.  She was diagnosed with depression in the 1990, zoloft worked for her, did not do well on paxil or effexor.  As noted below, was in talk therapy for a few years and was seeing a psychiatrist early on in her illness.  Of note, reports an episode of acute neurologic symptoms that developed in the mid 1990s, problems with balance, speech, etc, concern for a stroke at that time led to a "million dollar workup" which revealed no clear etiology,  did require a medical leave from work.  Had some acute anxiety upon returning, which she was able to work through with medications and about 2 years of therapy.   \par \par Mrs. Arnold had significant responsibilities during COVID, was in multiple stressful situations (for patients, families and her staff).  Reports some nightmares related to the experience.\par \par Happily, she reports she and her  have recently purchased a home in her hometown in [place].  \par \par Hx of HLD: Last , had been off of rosuvastatin, still only takes intermittently\par Has had no palpitations\par Menopause about 10 years ago at age 47\par \par HCM: \par Pap Jan 2021: normal\par CLS Valeriano 6/2018, premalig lesions, repeat 2023\par Pneumovax due - declines today\par Lung Cancer Screening: Due\par Mammo and DEXA reports recently completed\par

## 2021-06-08 LAB
25(OH)D3 SERPL-MCNC: 25.6 NG/ML
ALBUMIN SERPL ELPH-MCNC: 4.8 G/DL
ALP BLD-CCNC: 77 U/L
ALT SERPL-CCNC: 16 U/L
ANION GAP SERPL CALC-SCNC: 14 MMOL/L
AST SERPL-CCNC: 17 U/L
BASOPHILS # BLD AUTO: 0.07 K/UL
BASOPHILS NFR BLD AUTO: 0.8 %
BILIRUB SERPL-MCNC: 0.3 MG/DL
BUN SERPL-MCNC: 11 MG/DL
CALCIUM SERPL-MCNC: 10 MG/DL
CHLORIDE SERPL-SCNC: 101 MMOL/L
CHOLEST SERPL-MCNC: 298 MG/DL
CO2 SERPL-SCNC: 24 MMOL/L
CREAT SERPL-MCNC: 0.56 MG/DL
EOSINOPHIL # BLD AUTO: 0.03 K/UL
EOSINOPHIL NFR BLD AUTO: 0.3 %
ESTIMATED AVERAGE GLUCOSE: 126 MG/DL
GLUCOSE SERPL-MCNC: 102 MG/DL
HBA1C MFR BLD HPLC: 6 %
HCT VFR BLD CALC: 45.1 %
HDLC SERPL-MCNC: 48 MG/DL
HGB BLD-MCNC: 15 G/DL
IMM GRANULOCYTES NFR BLD AUTO: 0.3 %
LDLC SERPL CALC-MCNC: 216 MG/DL
LYMPHOCYTES # BLD AUTO: 2.43 K/UL
LYMPHOCYTES NFR BLD AUTO: 27.4 %
MAN DIFF?: NORMAL
MCHC RBC-ENTMCNC: 29.4 PG
MCHC RBC-ENTMCNC: 33.3 GM/DL
MCV RBC AUTO: 88.4 FL
MONOCYTES # BLD AUTO: 0.4 K/UL
MONOCYTES NFR BLD AUTO: 4.5 %
NEUTROPHILS # BLD AUTO: 5.9 K/UL
NEUTROPHILS NFR BLD AUTO: 66.7 %
NONHDLC SERPL-MCNC: 251 MG/DL
PLATELET # BLD AUTO: 289 K/UL
POTASSIUM SERPL-SCNC: 4.4 MMOL/L
PROT SERPL-MCNC: 7.8 G/DL
RBC # BLD: 5.1 M/UL
RBC # FLD: 13.9 %
SODIUM SERPL-SCNC: 139 MMOL/L
TRIGL SERPL-MCNC: 173 MG/DL
TSH SERPL-ACNC: 0.96 UIU/ML
VIT B12 SERPL-MCNC: 701 PG/ML
WBC # FLD AUTO: 8.86 K/UL

## 2021-06-15 ENCOUNTER — TRANSCRIPTION ENCOUNTER (OUTPATIENT)
Age: 56
End: 2021-06-15

## 2021-06-29 ENCOUNTER — APPOINTMENT (OUTPATIENT)
Dept: THORACIC SURGERY | Facility: CLINIC | Age: 56
End: 2021-06-29
Payer: COMMERCIAL

## 2021-06-29 ENCOUNTER — NON-APPOINTMENT (OUTPATIENT)
Age: 56
End: 2021-06-29

## 2021-06-29 VITALS — BODY MASS INDEX: 30.21 KG/M2 | WEIGHT: 160 LBS | HEIGHT: 61 IN

## 2021-06-29 DIAGNOSIS — Z12.2 ENCOUNTER FOR SCREENING FOR MALIGNANT NEOPLASM OF RESPIRATORY ORGANS: ICD-10-CM

## 2021-06-29 PROCEDURE — 99406 BEHAV CHNG SMOKING 3-10 MIN: CPT

## 2021-06-29 PROCEDURE — 99072 ADDL SUPL MATRL&STAF TM PHE: CPT

## 2021-06-30 ENCOUNTER — OUTPATIENT (OUTPATIENT)
Dept: OUTPATIENT SERVICES | Facility: HOSPITAL | Age: 56
LOS: 1 days | End: 2021-06-30
Payer: COMMERCIAL

## 2021-06-30 ENCOUNTER — APPOINTMENT (OUTPATIENT)
Dept: CT IMAGING | Facility: IMAGING CENTER | Age: 56
End: 2021-06-30
Payer: COMMERCIAL

## 2021-06-30 DIAGNOSIS — Z00.8 ENCOUNTER FOR OTHER GENERAL EXAMINATION: ICD-10-CM

## 2021-06-30 PROCEDURE — 71271 CT THORAX LUNG CANCER SCR C-: CPT

## 2021-06-30 PROCEDURE — 71271 CT THORAX LUNG CANCER SCR C-: CPT | Mod: 26

## 2021-07-02 ENCOUNTER — NON-APPOINTMENT (OUTPATIENT)
Age: 56
End: 2021-07-02

## 2021-07-07 NOTE — HISTORY OF PRESENT ILLNESS
[TextBox_13] : She denies hemoptysis, denies new cough, denies unexplained weight loss.\par She is a current smoker with a 31 pack year smoking history (1PPD x 31 years).  She is interested in support in quitting.  She is referred by Dr. Maritza Espinoza who documented the shared decision making.\par

## 2021-07-07 NOTE — PLAN
[FreeTextEntry1] : [place] [facility] [place]  She will follow up with Dr. Espinoza for the results.

## 2021-07-28 ENCOUNTER — NON-APPOINTMENT (OUTPATIENT)
Age: 56
End: 2021-07-28

## 2021-08-09 ENCOUNTER — APPOINTMENT (OUTPATIENT)
Dept: INTERNAL MEDICINE | Facility: CLINIC | Age: 56
End: 2021-08-09
Payer: COMMERCIAL

## 2021-08-09 VITALS
HEART RATE: 78 BPM | BODY MASS INDEX: 30.4 KG/M2 | DIASTOLIC BLOOD PRESSURE: 70 MMHG | HEIGHT: 61 IN | SYSTOLIC BLOOD PRESSURE: 137 MMHG | WEIGHT: 161 LBS | OXYGEN SATURATION: 97 % | TEMPERATURE: 97.6 F

## 2021-08-09 PROCEDURE — 99214 OFFICE O/P EST MOD 30 MIN: CPT

## 2021-08-10 LAB
CHOLEST SERPL-MCNC: 170 MG/DL
HDLC SERPL-MCNC: 57 MG/DL
LDLC SERPL CALC-MCNC: 85 MG/DL
NONHDLC SERPL-MCNC: 113 MG/DL
TRIGL SERPL-MCNC: 141 MG/DL

## 2021-08-11 NOTE — ASSESSMENT
[FreeTextEntry1] : 56 y/o female for f/u\par \par Pulm:  CT scan results as noted above, will check CXR to further clarify GGO, is asymptomatic\par -otherwise repeat CT in 1 year\par \par HLD:  repeat labs today with regular statin use\par \par Anxiety: discussed talk therapy and medications, currently stable, will follow.

## 2021-08-11 NOTE — PHYSICAL EXAM
[No Acute Distress] : no acute distress [Well-Appearing] : well-appearing [Normal Sclera/Conjunctiva] : normal sclera/conjunctiva [No Respiratory Distress] : no respiratory distress  [Clear to Auscultation] : lungs were clear to auscultation bilaterally [Normal Rate] : normal rate  [Regular Rhythm] : with a regular rhythm [Normal S1, S2] : normal S1 and S2 [No Murmur] : no murmur heard

## 2021-08-11 NOTE — HISTORY OF PRESENT ILLNESS
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  \par \par

## 2021-08-17 DIAGNOSIS — Z11.59 ENCOUNTER FOR SCREENING FOR OTHER VIRAL DISEASES: ICD-10-CM

## 2021-08-23 ENCOUNTER — LABORATORY RESULT (OUTPATIENT)
Age: 56
End: 2021-08-23

## 2021-08-24 LAB — SARS-COV-2 N GENE NPH QL NAA+PROBE: NOT DETECTED

## 2021-11-03 ENCOUNTER — NON-APPOINTMENT (OUTPATIENT)
Age: 56
End: 2021-11-03

## 2021-12-13 DIAGNOSIS — Z11.59 ENCOUNTER FOR SCREENING FOR OTHER VIRAL DISEASES: ICD-10-CM

## 2021-12-16 LAB — SARS-COV-2 N GENE NPH QL NAA+PROBE: NOT DETECTED

## 2022-02-07 ENCOUNTER — NON-APPOINTMENT (OUTPATIENT)
Age: 57
End: 2022-02-07

## 2022-03-14 ENCOUNTER — TRANSCRIPTION ENCOUNTER (OUTPATIENT)
Age: 57
End: 2022-03-14

## 2022-04-11 ENCOUNTER — RX RENEWAL (OUTPATIENT)
Age: 57
End: 2022-04-11

## 2022-04-11 PROBLEM — Z11.59 SCREENING FOR VIRAL DISEASE: Status: RESOLVED | Noted: 2021-04-25 | Resolved: 2021-04-27

## 2022-04-11 PROBLEM — Z11.59 SCREENING FOR VIRAL DISEASE: Status: RESOLVED | Noted: 2020-09-15 | Resolved: 2020-09-17

## 2022-04-11 PROBLEM — Z11.59 SCREENING FOR VIRAL DISEASE: Status: RESOLVED | Noted: 2020-10-05 | Resolved: 2021-06-07

## 2022-04-11 PROBLEM — Z11.59 SCREENING FOR VIRAL DISEASE: Status: RESOLVED | Noted: 2021-12-13 | Resolved: 2021-12-15

## 2022-04-11 PROBLEM — Z11.59 SCREENING FOR VIRAL DISEASE: Status: RESOLVED | Noted: 2021-08-17 | Resolved: 2021-08-19

## 2022-05-12 ENCOUNTER — NON-APPOINTMENT (OUTPATIENT)
Age: 57
End: 2022-05-12

## 2022-05-26 ENCOUNTER — APPOINTMENT (OUTPATIENT)
Dept: PSYCHIATRY | Facility: CLINIC | Age: 57
End: 2022-05-26
Payer: COMMERCIAL

## 2022-05-26 PROCEDURE — 90791 PSYCH DIAGNOSTIC EVALUATION: CPT | Mod: 95

## 2022-06-01 ENCOUNTER — APPOINTMENT (OUTPATIENT)
Dept: PSYCHIATRY | Facility: CLINIC | Age: 57
End: 2022-06-01
Payer: COMMERCIAL

## 2022-06-01 PROCEDURE — 90837 PSYTX W PT 60 MINUTES: CPT | Mod: 95

## 2022-06-10 ENCOUNTER — APPOINTMENT (OUTPATIENT)
Dept: PSYCHIATRY | Facility: CLINIC | Age: 57
End: 2022-06-10
Payer: COMMERCIAL

## 2022-06-10 PROCEDURE — 90837 PSYTX W PT 60 MINUTES: CPT | Mod: 95

## 2022-06-22 ENCOUNTER — RX RENEWAL (OUTPATIENT)
Age: 57
End: 2022-06-22

## 2022-06-23 ENCOUNTER — APPOINTMENT (OUTPATIENT)
Dept: PSYCHIATRY | Facility: CLINIC | Age: 57
End: 2022-06-23
Payer: COMMERCIAL

## 2022-06-23 PROCEDURE — 90837 PSYTX W PT 60 MINUTES: CPT | Mod: 95

## 2022-06-27 ENCOUNTER — APPOINTMENT (OUTPATIENT)
Dept: INTERNAL MEDICINE | Facility: CLINIC | Age: 57
End: 2022-06-27
Payer: COMMERCIAL

## 2022-06-27 ENCOUNTER — APPOINTMENT (OUTPATIENT)
Dept: PSYCHIATRY | Facility: CLINIC | Age: 57
End: 2022-06-27
Payer: COMMERCIAL

## 2022-06-27 VITALS
HEART RATE: 85 BPM | BODY MASS INDEX: 30.58 KG/M2 | HEIGHT: 61 IN | WEIGHT: 162 LBS | TEMPERATURE: 98.7 F | SYSTOLIC BLOOD PRESSURE: 123 MMHG | DIASTOLIC BLOOD PRESSURE: 76 MMHG

## 2022-06-27 DIAGNOSIS — R91.8 OTHER NONSPECIFIC ABNORMAL FINDING OF LUNG FIELD: ICD-10-CM

## 2022-06-27 PROCEDURE — 36415 COLL VENOUS BLD VENIPUNCTURE: CPT

## 2022-06-27 PROCEDURE — 99396 PREV VISIT EST AGE 40-64: CPT | Mod: 25

## 2022-06-27 PROCEDURE — 90837 PSYTX W PT 60 MINUTES: CPT | Mod: 95

## 2022-06-27 RX ORDER — CYCLOBENZAPRINE HYDROCHLORIDE 10 MG/1
10 TABLET, FILM COATED ORAL 3 TIMES DAILY
Qty: 20 | Refills: 0 | Status: DISCONTINUED | COMMUNITY
Start: 2021-06-30 | End: 2022-06-27

## 2022-06-27 RX ORDER — NICOTINE 4 MG/1
10 INHALANT RESPIRATORY (INHALATION)
Qty: 90 | Refills: 3 | Status: ACTIVE | COMMUNITY
Start: 2017-08-04 | End: 1900-01-01

## 2022-06-28 LAB
ALBUMIN SERPL ELPH-MCNC: 4.8 G/DL
ALP BLD-CCNC: 83 U/L
ALT SERPL-CCNC: 16 U/L
ANION GAP SERPL CALC-SCNC: 11 MMOL/L
AST SERPL-CCNC: 16 U/L
BASOPHILS # BLD AUTO: 0.05 K/UL
BASOPHILS NFR BLD AUTO: 0.6 %
BILIRUB SERPL-MCNC: 0.3 MG/DL
BUN SERPL-MCNC: 15 MG/DL
CALCIUM SERPL-MCNC: 9.5 MG/DL
CHLORIDE SERPL-SCNC: 102 MMOL/L
CHOLEST SERPL-MCNC: 184 MG/DL
CO2 SERPL-SCNC: 26 MMOL/L
CREAT SERPL-MCNC: 0.53 MG/DL
EGFR: 109 ML/MIN/1.73M2
EOSINOPHIL # BLD AUTO: 0.11 K/UL
EOSINOPHIL NFR BLD AUTO: 1.3 %
ESTIMATED AVERAGE GLUCOSE: 140 MG/DL
GLUCOSE SERPL-MCNC: 166 MG/DL
HBA1C MFR BLD HPLC: 6.5 %
HCT VFR BLD CALC: 41.6 %
HDLC SERPL-MCNC: 44 MG/DL
HGB BLD-MCNC: 14.3 G/DL
IMM GRANULOCYTES NFR BLD AUTO: 0.2 %
LDLC SERPL CALC-MCNC: 103 MG/DL
LYMPHOCYTES # BLD AUTO: 2.48 K/UL
LYMPHOCYTES NFR BLD AUTO: 29.2 %
MAN DIFF?: NORMAL
MCHC RBC-ENTMCNC: 29.8 PG
MCHC RBC-ENTMCNC: 34.4 GM/DL
MCV RBC AUTO: 86.7 FL
MONOCYTES # BLD AUTO: 0.55 K/UL
MONOCYTES NFR BLD AUTO: 6.5 %
NEUTROPHILS # BLD AUTO: 5.27 K/UL
NEUTROPHILS NFR BLD AUTO: 62.2 %
NONHDLC SERPL-MCNC: 140 MG/DL
PLATELET # BLD AUTO: 242 K/UL
POTASSIUM SERPL-SCNC: 3.9 MMOL/L
PROT SERPL-MCNC: 7.2 G/DL
RBC # BLD: 4.8 M/UL
RBC # FLD: 13.2 %
SODIUM SERPL-SCNC: 139 MMOL/L
TRIGL SERPL-MCNC: 184 MG/DL
TSH SERPL-ACNC: 0.9 UIU/ML
WBC # FLD AUTO: 8.48 K/UL

## 2022-06-28 NOTE — ASSESSMENT
[FreeTextEntry1] : 57 y/o female for f/u\par \par Pulm:  Lung CT screening due at end of month, ordered\par \par TObacco Use:  is interested in quitting, prescribed nicotrol again\par \par HLD:  repeat labs today with regular statin use (rosuvastatin 5mg daily)\par \par Depression/Anxiety: in care with  since May which has been a good expereince.  Does not want to change her medications at this time, we will continue to monitor symptoms as she works with the therapist\par ZOloft 300 daily, \par xanax 0.5mg prn, uses about daily\par ISTOP checked.   \par \par Severe migraine with vertigo, nauseau.  this was an atypical HA for her, has not had migraines in years, and accompanied by severe symptoms,  will check MR of brain to r/o intracranial pathology\par \par \par HCM: \par -labs as noted\par -COVID vaccines completed\par -mammo ordered\par -CLS due 2023\par \par

## 2022-06-28 NOTE — PHYSICAL EXAM
[No Acute Distress] : no acute distress [Well-Appearing] : well-appearing [Normal Sclera/Conjunctiva] : normal sclera/conjunctiva [EOMI] : extraocular movements intact [No Lymphadenopathy] : no lymphadenopathy [Supple] : supple [No Respiratory Distress] : no respiratory distress  [Clear to Auscultation] : lungs were clear to auscultation bilaterally [Normal Rate] : normal rate  [Regular Rhythm] : with a regular rhythm [Normal S1, S2] : normal S1 and S2 [No Murmur] : no murmur heard [No Edema] : there was no peripheral edema [Soft] : abdomen soft [Non Tender] : non-tender [Coordination Grossly Intact] : coordination grossly intact [No Focal Deficits] : no focal deficits [Normal Gait] : normal gait

## 2022-06-28 NOTE — HISTORY OF PRESENT ILLNESS
[de-identified] : 57 y/o female for cPE\par \par Last seen here August 2021\mikael Has started working with  since them Started therapy and it has been helpful! \mikael boucher Developed a bad migraine on Monday while at work. with dizziness and nausea, had a hard time walking, Took excedrin migraine 1 or 2 times.   Was able drive home.  Had similar symptoms the next day, less intense, lasted through the week.  Relief from vertigo only if lying on her side.  Friday felt well enough to drive/go out.  \par \par Hx of severe migraines in the past with visual aura.  Needed to be treated with steroids, led to a six month LUIS to address them, decreased over time.\par \par Of note, her brother had a similar symptoms a few years ago and had some findings at the base of the brain, which went away.\par \mikael Still has episodes of being in a daze and cognitively feels dysfunctional and confused.  Can't seem to attribute it certain triggers, has been eating more.\par \mikael Wants to quit smoking, has increased her smoking, would like to try nicotrol\par

## 2022-06-30 ENCOUNTER — OUTPATIENT (OUTPATIENT)
Dept: OUTPATIENT SERVICES | Facility: HOSPITAL | Age: 57
LOS: 1 days | End: 2022-06-30
Payer: COMMERCIAL

## 2022-06-30 ENCOUNTER — APPOINTMENT (OUTPATIENT)
Dept: MRI IMAGING | Facility: IMAGING CENTER | Age: 57
End: 2022-06-30

## 2022-06-30 DIAGNOSIS — R42 DIZZINESS AND GIDDINESS: ICD-10-CM

## 2022-06-30 DIAGNOSIS — Z00.8 ENCOUNTER FOR OTHER GENERAL EXAMINATION: ICD-10-CM

## 2022-06-30 PROCEDURE — 70551 MRI BRAIN STEM W/O DYE: CPT | Mod: 26

## 2022-06-30 PROCEDURE — 70551 MRI BRAIN STEM W/O DYE: CPT

## 2022-07-08 ENCOUNTER — APPOINTMENT (OUTPATIENT)
Dept: PSYCHIATRY | Facility: CLINIC | Age: 57
End: 2022-07-08

## 2022-07-08 ENCOUNTER — NON-APPOINTMENT (OUTPATIENT)
Age: 57
End: 2022-07-08

## 2022-07-15 ENCOUNTER — APPOINTMENT (OUTPATIENT)
Dept: PSYCHIATRY | Facility: CLINIC | Age: 57
End: 2022-07-15

## 2022-07-15 PROCEDURE — 90834 PSYTX W PT 45 MINUTES: CPT | Mod: 95

## 2022-07-19 ENCOUNTER — NON-APPOINTMENT (OUTPATIENT)
Age: 57
End: 2022-07-19

## 2022-07-19 VITALS — WEIGHT: 162 LBS | HEIGHT: 61 IN | BODY MASS INDEX: 30.58 KG/M2

## 2022-07-19 DIAGNOSIS — F17.210 NICOTINE DEPENDENCE, CIGARETTES, UNCOMPLICATED: ICD-10-CM

## 2022-07-19 NOTE — PLAN
[Smoking Cessation Guidance Provided] : Smoking cessation guidance was provided to patient [Smoking Cessation] : smoking cessation [Regular follow-up with healthcare provider] : regular follow-up with healthcare provider [FreeTextEntry1] : LDCT for lung cancer screening scheduled for 7/21/22 at [facility]\par \par Patient to f/u with Dr. Espinoza to review results of LDCT

## 2022-07-19 NOTE — HISTORY OF PRESENT ILLNESS
[Current] : current smoker [_____ pack-years] : [unfilled] pack-years [TextBox_13] : Referred by Dr. Maritza Espinoza. \par No documented  hemoptysis,  new cough, or unexplained weight loss.\par She is a current smoker with a 32 pack year smoking history (1PPD x 32 years). . As per Dr. Espinoza, patient wants to quit smoking, has increased smoking and would like to try nicotrol. No documented personal or family history of lung cancer.\par  [TextBox_6] : 1 [TextBox_8] : 32

## 2022-07-19 NOTE — REASON FOR VISIT
[Annual Follow-Up] : an annual follow-up visit [Review of Eligibility] : review of eligibility [Low-Dose CT Screening Discussion] : low-dose CT lung cancer screening discussion [Virtual Visit] : virtual visit [Home] : at home, [unfilled] , at the time of the visit. [Medical Office: ([facility])___] : at the medical office located in  [Verbal consent obtained from patient] : the patient, [unfilled]

## 2022-07-21 ENCOUNTER — APPOINTMENT (OUTPATIENT)
Dept: MAMMOGRAPHY | Facility: IMAGING CENTER | Age: 57
End: 2022-07-21

## 2022-07-21 ENCOUNTER — RESULT REVIEW (OUTPATIENT)
Age: 57
End: 2022-07-21

## 2022-07-21 ENCOUNTER — APPOINTMENT (OUTPATIENT)
Dept: ULTRASOUND IMAGING | Facility: IMAGING CENTER | Age: 57
End: 2022-07-21

## 2022-07-21 ENCOUNTER — OUTPATIENT (OUTPATIENT)
Dept: OUTPATIENT SERVICES | Facility: HOSPITAL | Age: 57
LOS: 1 days | End: 2022-07-21
Payer: COMMERCIAL

## 2022-07-21 ENCOUNTER — APPOINTMENT (OUTPATIENT)
Dept: CT IMAGING | Facility: IMAGING CENTER | Age: 57
End: 2022-07-21

## 2022-07-21 DIAGNOSIS — Z00.8 ENCOUNTER FOR OTHER GENERAL EXAMINATION: ICD-10-CM

## 2022-07-21 PROCEDURE — 77063 BREAST TOMOSYNTHESIS BI: CPT | Mod: 26

## 2022-07-21 PROCEDURE — 76641 ULTRASOUND BREAST COMPLETE: CPT

## 2022-07-21 PROCEDURE — 76641 ULTRASOUND BREAST COMPLETE: CPT | Mod: 26,50

## 2022-07-21 PROCEDURE — 77067 SCR MAMMO BI INCL CAD: CPT | Mod: 26

## 2022-07-21 PROCEDURE — 77063 BREAST TOMOSYNTHESIS BI: CPT

## 2022-07-21 PROCEDURE — 71271 CT THORAX LUNG CANCER SCR C-: CPT

## 2022-07-21 PROCEDURE — 71271 CT THORAX LUNG CANCER SCR C-: CPT | Mod: 26

## 2022-07-21 PROCEDURE — 77067 SCR MAMMO BI INCL CAD: CPT

## 2022-07-22 ENCOUNTER — APPOINTMENT (OUTPATIENT)
Dept: PSYCHIATRY | Facility: CLINIC | Age: 57
End: 2022-07-22

## 2022-07-22 PROCEDURE — 90837 PSYTX W PT 60 MINUTES: CPT | Mod: 95

## 2022-07-29 ENCOUNTER — APPOINTMENT (OUTPATIENT)
Dept: PSYCHIATRY | Facility: CLINIC | Age: 57
End: 2022-07-29

## 2022-07-29 PROCEDURE — 90837 PSYTX W PT 60 MINUTES: CPT | Mod: 95

## 2022-08-05 ENCOUNTER — APPOINTMENT (OUTPATIENT)
Dept: PSYCHIATRY | Facility: CLINIC | Age: 57
End: 2022-08-05

## 2022-08-05 PROCEDURE — 90837 PSYTX W PT 60 MINUTES: CPT | Mod: 95

## 2022-08-24 ENCOUNTER — APPOINTMENT (OUTPATIENT)
Dept: PSYCHIATRY | Facility: CLINIC | Age: 57
End: 2022-08-24

## 2022-08-24 PROCEDURE — 90837 PSYTX W PT 60 MINUTES: CPT | Mod: 95

## 2022-09-02 ENCOUNTER — APPOINTMENT (OUTPATIENT)
Dept: PSYCHIATRY | Facility: CLINIC | Age: 57
End: 2022-09-02

## 2022-09-02 PROCEDURE — 90837 PSYTX W PT 60 MINUTES: CPT | Mod: 95

## 2022-09-09 ENCOUNTER — APPOINTMENT (OUTPATIENT)
Dept: PSYCHIATRY | Facility: CLINIC | Age: 57
End: 2022-09-09

## 2022-09-09 PROCEDURE — 90834 PSYTX W PT 45 MINUTES: CPT | Mod: 95

## 2022-09-16 ENCOUNTER — APPOINTMENT (OUTPATIENT)
Dept: PSYCHIATRY | Facility: CLINIC | Age: 57
End: 2022-09-16

## 2022-09-16 PROCEDURE — 90837 PSYTX W PT 60 MINUTES: CPT | Mod: 95

## 2022-09-23 ENCOUNTER — APPOINTMENT (OUTPATIENT)
Dept: PSYCHIATRY | Facility: CLINIC | Age: 57
End: 2022-09-23

## 2022-09-23 PROCEDURE — 90837 PSYTX W PT 60 MINUTES: CPT | Mod: 95

## 2022-09-27 NOTE — ED ADULT TRIAGE NOTE - RESPIRATORY RATE (BREATHS/MIN)
18 Sski Pregnancy And Lactation Text: This medication is Pregnancy Category D and isn't considered safe during pregnancy. It is excreted in breast milk.

## 2022-10-07 ENCOUNTER — APPOINTMENT (OUTPATIENT)
Dept: PSYCHIATRY | Facility: CLINIC | Age: 57
End: 2022-10-07

## 2022-10-07 PROCEDURE — 90837 PSYTX W PT 60 MINUTES: CPT | Mod: 95

## 2022-10-07 NOTE — REASON FOR VISIT
[Home] : at home, [unfilled] , at the time of the visit. [Other Location: e.g. Home (Enter Location, City,State)___] : at [unfilled] [Patient] : Patient [FreeTextEntry1] : "Depression, anxiety, and confusion"

## 2022-10-07 NOTE — PHYSICAL EXAM
[Well groomed] : well groomed [Average] : average [Cooperative] : cooperative [Anxious] : anxious [Clear] : clear [Linear/Goal Directed] : linear/goal directed [None] : none [None Reported] : none reported [Reexperiencing] : reexperiencing [WNL] : within normal limits [Above average] : above average [FreeTextEntry1] : casually dressed [FreeTextEntry8] : sad, lonely [de-identified] : slightly constricted, mood-congruent, anxious and sad [de-identified] : normal rate/rhythm/volume [FreeTextEntry7] : focused on work and interactions with family and friends [de-identified] : good [de-identified] : good

## 2022-10-07 NOTE — PLAN
[Cognitive Processing Therapy] : Cognitive Processing Therapy  [Recommended Frequency of Visits: ____] : Recommended frequency of visits: [unfilled] [FreeTextEntry2] : \par Problem 1: Re-experiencing sx (intrusive memories, flashbacks, reactivity to triggers)\par Goal 1: 30% reduction in re-experiencing sx \par \par Problem 2: Anxiety, overwhelm, and fogginess\par Goal 2: 30% reduction in anxiety, overwhelm, and fogginess\par \par Problem 3: Negative emotions (e.g., anger, guilt, sadness) and moral injury\par Goal 3: 30% reduction in negative emotions and moral injury. \par  [de-identified] : This was CPT Session 3. Pt reported she had a challenging few weeks at work. Pt also stated she experienced some loneliness on her birthday, though she spoke fondly about her  celebrating with her. Pt completed the HW to do ABC worksheets. She was worried if she did the assignment correctly but reinforcement and encouragement were provided. Her ABC worksheets focused on work-related stressors, interactions with family and friends, and there was one about a loss in her family. Writer and pt reviewed her ABC worksheets, and pt clearly grasped the relationship between events, thoughts, and feelings. The most prevalent feelings in her worksheets were guilt, self-blame, anger, anxiety, and loneliness. Writer engaged pt in some Socratic questioning, and she was open to this. HW was assigned for pt to complete ABC worksheets about her index trauma until the next session.  [Return in ____ week(s)] : Return in [unfilled] week(s) [FreeTextEntry1] : Continue weekly individual therapy. Next session will be Session 4 of CPT. Continue to monitor sx and measure progress using self-report measures and HW.

## 2022-10-07 NOTE — RISK ASSESSMENT
[No] : No [FreeTextEntry8] : Pt did not endorse current SIIP. Pt stated she recently had thoughts that it would be "better if I was not here," but she clarified that she wanted to be away from her stressors and still living, and this was not a suicidal ideation.

## 2022-10-13 ENCOUNTER — APPOINTMENT (OUTPATIENT)
Dept: PSYCHIATRY | Facility: CLINIC | Age: 57
End: 2022-10-13

## 2022-10-21 ENCOUNTER — APPOINTMENT (OUTPATIENT)
Dept: PSYCHIATRY | Facility: CLINIC | Age: 57
End: 2022-10-21

## 2022-10-21 PROCEDURE — 90837 PSYTX W PT 60 MINUTES: CPT | Mod: 95

## 2022-10-21 NOTE — PLAN
[Cognitive Processing Therapy] : Cognitive Processing Therapy  [Recommended Frequency of Visits: ____] : Recommended frequency of visits: [unfilled] [Return in ____ week(s)] : Return in [unfilled] week(s) [FreeTextEntry2] : \par Problem 1: Re-experiencing sx (intrusive memories, flashbacks, reactivity to triggers)\par Goal 1: 30% reduction in re-experiencing sx \par \par Problem 2: Anxiety, overwhelm, and fogginess\par Goal 2: 30% reduction in anxiety, overwhelm, and fogginess\par \par Problem 3: Negative emotions (e.g., anger, guilt, sadness) and moral injury\par Goal 3: 30% reduction in negative emotions and moral injury. \par  [de-identified] : This was CPT Session 4. Pt reported COVID rates have risen at the hospital, however she has not been triggered as visitation continues to be allowed. Pt stated she is not that worried about COVID anymore, but fears other viruses and threats in the future. Pt reported she found the HW to do ABC worksheets about the trauma challenging. Pt completed the assignment, but felt confused as she thought her previous ABC worksheets also addressed the trauma. Writer clarified further, and review of pt's new ABC worksheets demonstrated that she grasps the connection between events, thoughts, and feelings. The feelings that came up the most this week were guilt, anger, sadness, loneliness, and confusion. Concepts of blame/fault and responsibility and "Challenging Questions" worksheets were briefly introduced, and HW was assigned to review this material prior to next session.  [FreeTextEntry1] : Continue weekly individual therapy. Complete Session 4 of CPT next week. Continue to monitor sx and measure progress using self-report measures and HW.

## 2022-10-21 NOTE — PHYSICAL EXAM
[Well groomed] : well groomed [Average] : average [Cooperative] : cooperative [Anxious] : anxious [Clear] : clear [Linear/Goal Directed] : linear/goal directed [None] : none [None Reported] : none reported [Reexperiencing] : reexperiencing [WNL] : within normal limits [Above average] : above average [FreeTextEntry1] : casually dressed [FreeTextEntry8] : sad, lonely, confused [de-identified] : slightly constricted, mood-congruent, anxious and sad [de-identified] : normal rate/rhythm/volume [FreeTextEntry7] : focused on work [de-identified] : good [de-identified] : good

## 2022-10-28 ENCOUNTER — APPOINTMENT (OUTPATIENT)
Dept: PSYCHIATRY | Facility: CLINIC | Age: 57
End: 2022-10-28

## 2022-10-28 ENCOUNTER — NON-APPOINTMENT (OUTPATIENT)
Age: 57
End: 2022-10-28

## 2022-11-03 ENCOUNTER — APPOINTMENT (OUTPATIENT)
Dept: PSYCHIATRY | Facility: CLINIC | Age: 57
End: 2022-11-03

## 2022-11-03 PROCEDURE — 90837 PSYTX W PT 60 MINUTES: CPT | Mod: 95

## 2022-11-04 NOTE — PHYSICAL EXAM
[Well groomed] : well groomed [Average] : average [Cooperative] : cooperative [Anxious] : anxious [Angry] : angry [Full] : full [Clear] : clear [Linear/Goal Directed] : linear/goal directed [None] : none [None Reported] : none reported [Reexperiencing] : reexperiencing [WNL] : within normal limits [Above average] : above average [FreeTextEntry1] : dressed in nursing scrubs [FreeTextEntry8] : worried [de-identified] : mood-congruent, anxious [de-identified] : normal rate/rhythm/volume [FreeTextEntry7] : focused on work [de-identified] : good [de-identified] : good

## 2022-11-04 NOTE — REASON FOR VISIT
[Home] : at home, [unfilled] , at the time of the visit. [Medical Office: ([facility])___] : at the medical office located in  [Patient] : Patient [FreeTextEntry1] : "Depression, anxiety, and confusion"

## 2022-11-04 NOTE — PLAN
[Cognitive Processing Therapy] : Cognitive Processing Therapy  [Recommended Frequency of Visits: ____] : Recommended frequency of visits: [unfilled] [Return in ____ week(s)] : Return in [unfilled] week(s) [FreeTextEntry2] : \par Problem 1: Re-experiencing sx (intrusive memories, flashbacks, reactivity to triggers)\par Goal 1: 30% reduction in re-experiencing sx \par \par Problem 2: Anxiety, overwhelm, and fogginess\par Goal 2: 30% reduction in anxiety, overwhelm, and fogginess\par \par Problem 3: Negative emotions (e.g., anger, guilt, sadness) and moral injury\par Goal 3: 30% reduction in negative emotions and moral injury. \par  [de-identified] : This was the conclusion of CPT Session 4. 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 Pt expressed anger and frustration, as well as worry and concern as to how to navigate these changes. Pt completed the HW to review handouts that were introduced last session. Writer provided psychoeducation about the difference between blame/fault and responsibility. Pt attempted to understand these concepts by applying them to her index trauma. Writer and pt selected one of her assimilated stuck points and began completing "Challenging Questions" together as an example. Pt was engaged with this though she found the exercise challenging; she was very open to guidance. HW was assigned for pt to complete the "Challenging Questions Worksheet" to challenge her assimilated stuck point.  [FreeTextEntry1] : Continue weekly individual therapy. Next session will be Session 5 of CPT. Continue to monitor sx and measure progress using self-report measures and HW.

## 2022-11-11 ENCOUNTER — APPOINTMENT (OUTPATIENT)
Dept: PSYCHIATRY | Facility: CLINIC | Age: 57
End: 2022-11-11

## 2022-11-11 ENCOUNTER — NON-APPOINTMENT (OUTPATIENT)
Age: 57
End: 2022-11-11

## 2022-11-18 ENCOUNTER — APPOINTMENT (OUTPATIENT)
Dept: PSYCHIATRY | Facility: CLINIC | Age: 57
End: 2022-11-18

## 2022-11-18 PROCEDURE — 90837 PSYTX W PT 60 MINUTES: CPT | Mod: 95

## 2022-11-18 NOTE — PHYSICAL EXAM
[Well groomed] : well groomed [Average] : average [Cooperative] : cooperative [Depressed] : depressed [Anxious] : anxious [Angry] : angry [Full] : full [Clear] : clear [Linear/Goal Directed] : linear/goal directed [None] : none [None Reported] : none reported [Reexperiencing] : reexperiencing [WNL] : within normal limits [Above average] : above average [FreeTextEntry1] : casually dressed [FreeTextEntry8] : hurt [de-identified] : mood-congruent, anxious [de-identified] : normal rate/rhythm/volume [FreeTextEntry7] : focused on work and the index trauma [de-identified] : good [de-identified] : good

## 2022-11-18 NOTE — RISK ASSESSMENT
[No, patient denies ideation or behavior] : No, patient denies ideation or behavior [No] : No [FreeTextEntry8] : Pt denied current SIIP. Pt reported feeling depressed and having thoughts such as, "I can't stand myself and I wish I didn't exist," but she was clear that this was related to low mood and she has not considered self-harm or suicide at any point.

## 2022-11-18 NOTE — PLAN
[Cognitive Processing Therapy] : Cognitive Processing Therapy  [Recommended Frequency of Visits: ____] : Recommended frequency of visits: [unfilled] [Return in ____ week(s)] : Return in [unfilled] week(s) [FreeTextEntry2] : \par Problem 1: Re-experiencing sx (intrusive memories, flashbacks, reactivity to triggers)\par Goal 1: 30% reduction in re-experiencing sx \par \par Problem 2: Anxiety, overwhelm, and fogginess\par Goal 2: 30% reduction in anxiety, overwhelm, and fogginess\par \par Problem 3: Negative emotions (e.g., anger, guilt, sadness) and moral injury\par Goal 3: 30% reduction in negative emotions and moral injury. \par  [de-identified] : This was CPT Session 5. Pt reported work continues to be challenging, with staffing changes and related shifts of responsibility. Pt described a situation that occurred around this reorganization, which triggered the pt's index trauma and brought up negative feelings. Pt completed the HW to do a "Challenging Questions" worksheets related to her index trauma. Writer and pt reviewed her "Challenging Questions" together. Pt was thoughtful in applying the "Challenging Questions" to her stuck point, and she stated the exercise helped her to see the situation in new and different ways. Writer reinforced the pt's hard work on this exercise. Writer introduced "Patterns of Problematic Thinking" worksheets, and HW was assigned to review this material prior to next session, as well as to complete an additional "Challenging Questions" worksheet about a related trauma from her past.  [FreeTextEntry1] : Continue weekly individual therapy. Complete Session 5 of CPT next week. Continue to monitor sx and measure progress using self-report measures and HW.

## 2022-11-23 ENCOUNTER — APPOINTMENT (OUTPATIENT)
Dept: PSYCHIATRY | Facility: CLINIC | Age: 57
End: 2022-11-23

## 2022-11-23 ENCOUNTER — RESULT REVIEW (OUTPATIENT)
Age: 57
End: 2022-11-23

## 2022-11-23 PROCEDURE — 90837 PSYTX W PT 60 MINUTES: CPT | Mod: 95

## 2022-11-23 NOTE — PHYSICAL EXAM
[Well groomed] : well groomed [Average] : average [Cooperative] : cooperative [Depressed] : depressed [Anxious] : anxious [Full] : full [Clear] : clear [Linear/Goal Directed] : linear/goal directed [Preoccupations/Ruminations] : preoccupations/ruminations [None Reported] : none reported [Reexperiencing] : reexperiencing [WNL] : within normal limits [Above average] : above average [FreeTextEntry1] : casually dressed [FreeTextEntry8] : worried, guilty [de-identified] : mood-congruent, anxious [de-identified] : normal rate/rhythm/volume [FreeTextEntry7] : focused on  and loss of father [de-identified] : good [de-identified] : good

## 2022-11-23 NOTE — REASON FOR VISIT
[Home] : at home, [unfilled] , at the time of the visit. [Medical Office: ([facility])___] : at the medical office located in  [Patient] : Patient [FreeTextEntry1] : "Depression, anxiety, and confusion"

## 2022-11-23 NOTE — PLAN
[Cognitive Processing Therapy] : Cognitive Processing Therapy  [Recommended Frequency of Visits: ____] : Recommended frequency of visits: [unfilled] [Return in ____ week(s)] : Return in [unfilled] week(s) [FreeTextEntry2] : \par Problem 1: Re-experiencing sx (intrusive memories, flashbacks, reactivity to triggers)\par Goal 1: 30% reduction in re-experiencing sx \par \par Problem 2: Anxiety, overwhelm, and fogginess\par Goal 2: 30% reduction in anxiety, overwhelm, and fogginess\par \par Problem 3: Negative emotions (e.g., anger, guilt, sadness) and moral injury\par Goal 3: 30% reduction in negative emotions and moral injury. \par  [de-identified] : This was the conclusion of CPT Session 5. Pt reported her  had a health scare but it turned out to be a benign issue that will respond to routine tx. Pt endorsed guilt and worry about this, as well as some relief. Pt discussed their future plans to retire to a home they purchased in [place]. Writer and pt reviewed another of her "Challenging Questions" worksheets, which focused on an assimilated stuck point around the loss of her father. Pt was engaged with Socratic questioning and began to realize there was not much evidence for her stuck point. Pt also completed the HW to review "Patterns of Problematic Thinking" worksheets. Pt related the most with mind reading, emotional reasoning, and jumping to conclusions. HW was assigned for pt to complete "Patterns of Problematic Thinking" worksheets, with stuck points as well as everyday thinking, prior to the next session.  [FreeTextEntry1] : Continue weekly individual therapy. Next session will be Session 6 of CPT. Continue to monitor sx and measure progress using self-report measures and HW.

## 2022-12-02 ENCOUNTER — APPOINTMENT (OUTPATIENT)
Dept: PSYCHIATRY | Facility: CLINIC | Age: 57
End: 2022-12-02

## 2022-12-02 PROCEDURE — 90834 PSYTX W PT 45 MINUTES: CPT | Mod: 95

## 2022-12-02 NOTE — RISK ASSESSMENT
[No, patient denies ideation or behavior] : No, patient denies ideation or behavior [No] : No [FreeTextEntry8] : Pt denied current SIIP.

## 2022-12-02 NOTE — PHYSICAL EXAM
[Well groomed] : well groomed [Average] : average [Cooperative] : cooperative [Anxious] : anxious [Full] : full [Clear] : clear [Linear/Goal Directed] : linear/goal directed [None] : none [None Reported] : none reported [WNL] : within normal limits [Above average] : above average [FreeTextEntry1] : casually dressed [FreeTextEntry8] : sad [de-identified] : mood-congruent, anxious [de-identified] : normal rhythm and volume, with some pausing [FreeTextEntry7] : focused on work [de-identified] : good [de-identified] : good

## 2022-12-02 NOTE — PLAN
[Cognitive Processing Therapy] : Cognitive Processing Therapy  [Recommended Frequency of Visits: ____] : Recommended frequency of visits: [unfilled] [Return in ____ week(s)] : Return in [unfilled] week(s) [FreeTextEntry2] : \par Problem 1: Re-experiencing sx (intrusive memories, flashbacks, reactivity to triggers)\par Goal 1: 30% reduction in re-experiencing sx \par \par Problem 2: Anxiety, overwhelm, and fogginess\par Goal 2: 30% reduction in anxiety, overwhelm, and fogginess\par \par Problem 3: Negative emotions (e.g., anger, guilt, sadness) and moral injury\par Goal 3: 30% reduction in negative emotions and moral injury. \par  [de-identified] : Pt was late to session due to technical difficulties. This was CPT Session 6. 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 Pt described the goodbye party and shared her feelings about ending this working relationship. 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 Pt felt confident she could fill this role with the support of other ANM's, however was concerned about the hours. Pt completed the HW to do "Patterns of Problematic Thinking" worksheet, however she stated it was a struggle. Writer and pt began to review her "Patterns of Problematic Thinking." Pt displayed an understanding of cognitive distortions, and writer worked with her to put each distorted thought into the best category. Pt utilized examples from both work and her personal life. HW was assigned for pt to continue working on the "Patterns of Problematic Thinking" worksheet as it was not reviewed in full today.   [FreeTextEntry1] : Continue weekly individual therapy. Continue Session 6 of CPT next week. Continue to monitor sx and measure progress using self-report measures and HW.

## 2022-12-09 ENCOUNTER — APPOINTMENT (OUTPATIENT)
Dept: PSYCHIATRY | Facility: CLINIC | Age: 57
End: 2022-12-09

## 2022-12-12 DIAGNOSIS — K60.2 ANAL FISSURE, UNSPECIFIED: ICD-10-CM

## 2022-12-12 RX ORDER — HYDROCORTISONE 25 MG/G
2.5 CREAM TOPICAL TWICE DAILY
Qty: 1 | Refills: 1 | Status: ACTIVE | COMMUNITY
Start: 2022-12-12 | End: 1900-01-01

## 2022-12-15 ENCOUNTER — APPOINTMENT (OUTPATIENT)
Dept: PSYCHIATRY | Facility: CLINIC | Age: 57
End: 2022-12-15

## 2022-12-15 PROCEDURE — 90837 PSYTX W PT 60 MINUTES: CPT | Mod: 95

## 2022-12-15 NOTE — PLAN
[Cognitive Processing Therapy] : Cognitive Processing Therapy  [Recommended Frequency of Visits: ____] : Recommended frequency of visits: [unfilled] [Return in ____ week(s)] : Return in [unfilled] week(s) [FreeTextEntry2] : \par Problem 1: Re-experiencing sx (intrusive memories, flashbacks, reactivity to triggers)\par Goal 1: 30% reduction in re-experiencing sx \par \par Problem 2: Anxiety, overwhelm, and fogginess\par Goal 2: 30% reduction in anxiety, overwhelm, and fogginess\par \par Problem 3: Negative emotions (e.g., anger, guilt, sadness) and moral injury\par Goal 3: 30% reduction in negative emotions and moral injury. \par  [de-identified] : This was the continuation of CPT Session 6. 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 Pt stated there is a lot of work to do between recruitment, pt care, managing staff, and preparing for joint commission. Pt reported she is feeling motivated and working hard, but expressed concern about her self-care. Pt stated she feels anxious and overwhelmed at times and is able to take a break to reset. Pt reported she is continuing to struggle with the HW assignment to do "Patterns of Problematic Thinking" worksheet. Pt stated she is too tired to do HW after a long day at work, and she agreed to prioritize doing HW on the weekends. Writer and pt continued to review the worksheet today and filled in more examples. HW was assigned for pt to complete the rest of the "Patterns of Problematic Thinking" worksheet prior to next session. [FreeTextEntry1] : Continue weekly individual therapy. Complete Session 6 of CPT next week. Continue to monitor sx and measure progress using self-report measures and HW.

## 2022-12-15 NOTE — REASON FOR VISIT
[Other Location: e.g. School (Enter Location, City,State)___] : at [unfilled], at the time of the visit. [Other Location: e.g. Home (Enter Location, City,State)___] : at [unfilled] [Patient] : Patient [FreeTextEntry1] : "Depression, anxiety, and confusion"

## 2022-12-15 NOTE — PHYSICAL EXAM
[Well groomed] : well groomed [Average] : average [Cooperative] : cooperative [Anxious] : anxious [Full] : full [Clear] : clear [Linear/Goal Directed] : linear/goal directed [None] : none [None Reported] : none reported [WNL] : within normal limits [Above average] : above average [FreeTextEntry1] : dressed in nursing scrubs [FreeTextEntry8] : more alert and energetic than usual [de-identified] : mood-congruent, anxious [de-identified] : normal rate/rhythm/volume [FreeTextEntry7] : focused on work [de-identified] : good [de-identified] : good

## 2022-12-20 ENCOUNTER — NON-APPOINTMENT (OUTPATIENT)
Age: 57
End: 2022-12-20

## 2022-12-20 ENCOUNTER — APPOINTMENT (OUTPATIENT)
Dept: PSYCHIATRY | Facility: CLINIC | Age: 57
End: 2022-12-20

## 2023-01-03 ENCOUNTER — APPOINTMENT (OUTPATIENT)
Dept: PSYCHIATRY | Facility: CLINIC | Age: 58
End: 2023-01-03
Payer: COMMERCIAL

## 2023-01-03 PROCEDURE — 90837 PSYTX W PT 60 MINUTES: CPT | Mod: 95

## 2023-01-03 NOTE — PHYSICAL EXAM
[Well groomed] : well groomed [Average] : average [Cooperative] : cooperative [Anxious] : anxious [Full] : full [Clear] : clear [Linear/Goal Directed] : linear/goal directed [None] : none [None Reported] : none reported [WNL] : within normal limits [Above average] : above average [FreeTextEntry1] : casually dressed [FreeTextEntry8] : worried but not depressed [de-identified] : mood-congruent, anxious [de-identified] : normal rate/rhythm/volume [FreeTextEntry7] : focused on family [de-identified] : good [de-identified] : good

## 2023-01-03 NOTE — PLAN
[Cognitive Processing Therapy] : Cognitive Processing Therapy  [Recommended Frequency of Visits: ____] : Recommended frequency of visits: [unfilled] [Return in ____ week(s)] : Return in [unfilled] week(s) [FreeTextEntry2] : \par Problem 1: Re-experiencing sx (intrusive memories, flashbacks, reactivity to triggers)\par Goal 1: 30% reduction in re-experiencing sx \par \par Problem 2: Anxiety, overwhelm, and fogginess\par Goal 2: 30% reduction in anxiety, overwhelm, and fogginess\par \par Problem 3: Negative emotions (e.g., anger, guilt, sadness) and moral injury\par Goal 3: 30% reduction in negative emotions and moral injury. \par  [de-identified] : Session focused on pt's trip back home to visit family for the holidays. Pt reported she enjoyed time with family, especially her nieces. Pt stated she spent a lot of time with her mother, however experienced some guilt. Pt reflected that she is a people pleaser, that objectively there was nothing to feel guilty about, however she felt that way anyway. Pt provided additional hx re her mother's struggle with alcoholism. Pt reported she had a relapse during her visit, which made the pt feel hurt, angry, and sad. Pt stated she was upset by this but was also able to move on more quickly, and she did not get stuck or depressed. Writer and pt both recognized this as an improvement attributable to her work in CPT. Writer and pt agreed to resume CPT protocol during next session. HW was assigned for pt to complete the rest of the "Patterns of Problematic Thinking" worksheet prior to next session. [FreeTextEntry1] : Continue weekly individual therapy. Complete Session 6 of CPT during next session. Continue to monitor sx and measure progress using self-report measures and HW. \par \par Due to writer's planned vacation next week, the next session will be held in 2 weeks.

## 2023-01-17 ENCOUNTER — APPOINTMENT (OUTPATIENT)
Dept: ULTRASOUND IMAGING | Facility: IMAGING CENTER | Age: 58
End: 2023-01-17
Payer: COMMERCIAL

## 2023-01-17 ENCOUNTER — OUTPATIENT (OUTPATIENT)
Dept: OUTPATIENT SERVICES | Facility: HOSPITAL | Age: 58
LOS: 1 days | End: 2023-01-17
Payer: COMMERCIAL

## 2023-01-17 DIAGNOSIS — N84.0 POLYP OF CORPUS UTERI: ICD-10-CM

## 2023-01-17 PROCEDURE — 76856 US EXAM PELVIC COMPLETE: CPT

## 2023-01-17 PROCEDURE — 76856 US EXAM PELVIC COMPLETE: CPT | Mod: 26

## 2023-01-17 PROCEDURE — 76830 TRANSVAGINAL US NON-OB: CPT | Mod: 26

## 2023-01-17 PROCEDURE — 76830 TRANSVAGINAL US NON-OB: CPT

## 2023-01-20 ENCOUNTER — APPOINTMENT (OUTPATIENT)
Dept: PSYCHIATRY | Facility: CLINIC | Age: 58
End: 2023-01-20

## 2023-01-20 ENCOUNTER — NON-APPOINTMENT (OUTPATIENT)
Age: 58
End: 2023-01-20

## 2023-01-26 ENCOUNTER — NON-APPOINTMENT (OUTPATIENT)
Age: 58
End: 2023-01-26

## 2023-01-26 ENCOUNTER — APPOINTMENT (OUTPATIENT)
Dept: PSYCHIATRY | Facility: CLINIC | Age: 58
End: 2023-01-26
Payer: COMMERCIAL

## 2023-01-26 PROCEDURE — 90837 PSYTX W PT 60 MINUTES: CPT | Mod: 95

## 2023-01-26 NOTE — PHYSICAL EXAM
[Well groomed] : well groomed [Average] : average [Cooperative] : cooperative [Anxious] : anxious [Full] : full [Clear] : clear [Linear/Goal Directed] : linear/goal directed [None] : none [None Reported] : none reported [WNL] : within normal limits [Above average] : above average [FreeTextEntry1] : well groomed, dressed in nursing scrubs [FreeTextEntry8] : worried, confused [de-identified] : mood-congruent, anxious [de-identified] : normal rate/rhythm/volume [FreeTextEntry7] : focused on  and his recovery from surgery [de-identified] : good [de-identified] : good

## 2023-01-26 NOTE — PLAN
[Cognitive Processing Therapy] : Cognitive Processing Therapy  [Recommended Frequency of Visits: ____] : Recommended frequency of visits: [unfilled] [Return in ____ week(s)] : Return in [unfilled] week(s) [FreeTextEntry2] : \par Problem 1: Re-experiencing sx (intrusive memories, flashbacks, reactivity to triggers)\par Goal 1: 30% reduction in re-experiencing sx \par \par Problem 2: Anxiety, overwhelm, and fogginess\par Goal 2: 30% reduction in anxiety, overwhelm, and fogginess\par \par Problem 3: Negative emotions (e.g., anger, guilt, sadness) and moral injury\par Goal 3: 30% reduction in negative emotions and moral injury. \par  [de-identified] : This was the continuation of CPT Session 6. Pt reported her 's surgery went well but he has experienced some challenges in his recovery. Pt stated he is safe and will come home from the hospital soon. However pt endorsed feeling anxious about her , and guilty that she is at work and not at the hospital with him. However pt acknowledged she would also feel guilty if she left work. This discussion of confusing and conflicting thoughts led into completing the "Patterns of Problematic Thinking" worksheet, which had been assigned for HW. Pt was able to provide examples for the remaining patterns and expressed understanding of the cognitive distortions. Writer provided introduction and explanation of "Challenging Beliefs" worksheets, and HW was assigned to review this material prior to next session. Pt was slightly overwhelmed but receptive to this next step in CPT.  [FreeTextEntry1] : Continue weekly individual therapy. Complete Session 6 of CPT next week. Continue to monitor sx and measure progress using self-report measures and HW.

## 2023-02-01 ENCOUNTER — APPOINTMENT (OUTPATIENT)
Dept: INTERNAL MEDICINE | Facility: CLINIC | Age: 58
End: 2023-02-01

## 2023-02-03 ENCOUNTER — APPOINTMENT (OUTPATIENT)
Dept: PSYCHIATRY | Facility: CLINIC | Age: 58
End: 2023-02-03
Payer: COMMERCIAL

## 2023-02-03 DIAGNOSIS — F43.10 POST-TRAUMATIC STRESS DISORDER, UNSPECIFIED: ICD-10-CM

## 2023-02-03 PROCEDURE — 90837 PSYTX W PT 60 MINUTES: CPT | Mod: 95

## 2023-02-04 PROBLEM — F43.10 POST TRAUMATIC STRESS DISORDER (PTSD): Status: ACTIVE | Noted: 2022-07-22

## 2023-02-04 NOTE — PHYSICAL EXAM
[Well groomed] : well groomed [Average] : average [Cooperative] : cooperative [Anxious] : anxious [Full] : full [Clear] : clear [Linear/Goal Directed] : linear/goal directed [None] : none [None Reported] : none reported [WNL] : within normal limits [Above average] : above average [FreeTextEntry1] : casually dressed [FreeTextEntry8] : confused, slightly overwhelmed [de-identified] : mood-congruent, anxious [de-identified] : normal rate/rhythm/volume [FreeTextEntry7] : focused on work and 's recovery [de-identified] : good [de-identified] : good

## 2023-02-04 NOTE — PLAN
[Cognitive Processing Therapy] : Cognitive Processing Therapy  [Recommended Frequency of Visits: ____] : Recommended frequency of visits: [unfilled] [Return in ____ week(s)] : Return in [unfilled] week(s) [FreeTextEntry2] : \par Problem 1: Re-experiencing sx (intrusive memories, flashbacks, reactivity to triggers)\par Goal 1: 30% reduction in re-experiencing sx \par \par Problem 2: Anxiety, overwhelm, and fogginess\par Goal 2: 30% reduction in anxiety, overwhelm, and fogginess\par \par Problem 3: Negative emotions (e.g., anger, guilt, sadness) and moral injury\par Goal 3: 30% reduction in negative emotions and moral injury. \par  [de-identified] : This was the conclusion of CPT Session 6. Pt reported she continues to feel confused and disoriented at times, got dressed and started drive to work today, then realized she is off work today, turned around, and came back home. Pt stated her  is home from the hospital recovering from surgery, and she was touched to receive a care package from her staff at work. Pt completed the HW to review new "Challenging Beliefs" worksheets, however she reported feeling a little overwhelmed by them due to the density. Pt was open to trying one in session. Writer and pt selected an assimilated stuck point about her index trauma and began working on a "Challenging Beliefs" worksheet together. Pt worked slowly but displayed a good understanding of the exercise. For HW this week, pt agreed to complete this "Challenging Beliefs" worksheet on her own and bring it to next session. [FreeTextEntry1] : Continue weekly individual therapy. Next session will be Session 7 of CPT. Continue to monitor sx and measure progress using self-report measures and HW.

## 2023-02-10 ENCOUNTER — APPOINTMENT (OUTPATIENT)
Dept: PSYCHIATRY | Facility: CLINIC | Age: 58
End: 2023-02-10
Payer: COMMERCIAL

## 2023-02-10 PROCEDURE — 90837 PSYTX W PT 60 MINUTES: CPT | Mod: 95

## 2023-02-11 NOTE — PLAN
[Cognitive Processing Therapy] : Cognitive Processing Therapy  [Recommended Frequency of Visits: ____] : Recommended frequency of visits: [unfilled] [Return in ____ week(s)] : Return in [unfilled] week(s) [FreeTextEntry2] : \par Problem 1: Re-experiencing sx (intrusive memories, flashbacks, reactivity to triggers)\par Goal 1: 30% reduction in re-experiencing sx \par \par Problem 2: Anxiety, overwhelm, and fogginess\par Goal 2: 30% reduction in anxiety, overwhelm, and fogginess\par \par Problem 3: Negative emotions (e.g., anger, guilt, sadness) and moral injury\par Goal 3: 30% reduction in negative emotions and moral injury. \par  [de-identified] : This was CPT Session 7. Pt reported she did not complete the HW due to facing multiple stressors with family and friends. Pt had to take a friend to the hospital for an emergency and was there for 14 hours. In addition, pt's sister called from [place] with concerns for pt's mother who is having a relapse. Pt endorsed worry for her mother. Pt described how sister is able to draw boundaries regarding mother's alcoholism, but the pt is not. Pt wondered how she will heal herself and recover with family depending on her to be a  and problem solver. Pt felt guilty that she had not done the HW. Writer and pt continued to work on her "Challenging Beliefs" worksheet together, focused on an assimilated stuck point about the pandemic. Pt made more progress on the exercise. For HW, the pt agreed to complete this "Challenging Beliefs" worksheet on her own and bring it to next session. [FreeTextEntry1] : Continue weekly individual therapy. Continue CPT Session 7 activities during next session. Continue to monitor sx and measure progress using self-report measures and HW. \par \par Due to writer's planned vacation next week, the next session will be held in 2 weeks.

## 2023-02-11 NOTE — PHYSICAL EXAM
[Well groomed] : well groomed [Average] : average [Cooperative] : cooperative [Depressed] : depressed [Anxious] : anxious [Full] : full [Clear] : clear [Linear/Goal Directed] : linear/goal directed [None] : none [None Reported] : none reported [WNL] : within normal limits [Above average] : above average [FreeTextEntry1] : casually dressed [FreeTextEntry8] : overwhelmed [de-identified] : mood-congruent, anxious, sad [de-identified] : normal rate/rhythm/volume [FreeTextEntry7] : focused on recent stressors with family and friends [de-identified] : good [de-identified] : good

## 2023-02-11 NOTE — ADDENDUM
[FreeTextEntry1] : PCL-5 = 24 - negative for PTSD\par \par Given 2 consecutive negative PCL-5 scores and the evident remission of re-experiencing and avoidance sx, pt's PTSD dx is now removed. Pt continues to have mood sx and a dx of MDD.

## 2023-02-24 ENCOUNTER — APPOINTMENT (OUTPATIENT)
Dept: PSYCHIATRY | Facility: CLINIC | Age: 58
End: 2023-02-24
Payer: COMMERCIAL

## 2023-02-24 PROCEDURE — 90834 PSYTX W PT 45 MINUTES: CPT | Mod: 95

## 2023-02-25 NOTE — PHYSICAL EXAM
[Well groomed] : well groomed [Average] : average [Cooperative] : cooperative [Anxious] : anxious [Full] : full [Clear] : clear [Linear/Goal Directed] : linear/goal directed [None] : none [None Reported] : none reported [WNL] : within normal limits [Above average] : above average [FreeTextEntry1] : casually dressed [FreeTextEntry8] : tired, sad [de-identified] : mood-congruent, anxious [de-identified] : normal rate/rhythm/volume [FreeTextEntry7] : focused on recent stressors with family [de-identified] : good [de-identified] : good

## 2023-02-25 NOTE — PLAN
[Cognitive Processing Therapy] : Cognitive Processing Therapy  [Recommended Frequency of Visits: ____] : Recommended frequency of visits: [unfilled] [Return in ____ week(s)] : Return in [unfilled] week(s) [FreeTextEntry2] : \par Problem 1: Re-experiencing sx (intrusive memories, flashbacks, reactivity to triggers)\par Goal 1: 30% reduction in re-experiencing sx \par \par Problem 2: Anxiety, overwhelm, and fogginess\par Goal 2: 30% reduction in anxiety, overwhelm, and fogginess\par \par Problem 3: Negative emotions (e.g., anger, guilt, sadness) and moral injury\par Goal 3: 30% reduction in negative emotions and moral injury. \par  [de-identified] : Session started late due to technical difficulties. This was the continuation of CPT Session 7. Pt reported she had a tough week due to work and family issues. She stated her sister lost her best friend to cancer. Pt reported this triggered memories of losing her father, though it did not trigger any COVID trauma. Pt did the HW to finish "Challenging Beliefs" worksheet focused on assimilated (self-blaming) stuck point about her index trauma. Writer and pt reviewed her work together, and by the end of the exercise, pt had generated more balanced alternative thoughts and she did not blame herself or feel as guilty about the index trauma. Writer praised pt's good work, and the pt explored what it would mean to not blame herself anymore. At end of session, writer introduced 5 themes targeted in the remainder of CPT. HW was assigned for pt to read the module on safety issues prior to next session. [FreeTextEntry1] : Continue weekly individual therapy. Complete Session 7 of CPT next week. Continue to monitor sx and measure progress using self-report measures and HW.

## 2023-03-03 ENCOUNTER — APPOINTMENT (OUTPATIENT)
Dept: ULTRASOUND IMAGING | Facility: CLINIC | Age: 58
End: 2023-03-03
Payer: COMMERCIAL

## 2023-03-03 ENCOUNTER — APPOINTMENT (OUTPATIENT)
Dept: PSYCHIATRY | Facility: CLINIC | Age: 58
End: 2023-03-03
Payer: COMMERCIAL

## 2023-03-03 PROCEDURE — 76831 ECHO EXAM UTERUS: CPT

## 2023-03-03 PROCEDURE — 58340 CATHETER FOR HYSTEROGRAPHY: CPT

## 2023-03-03 PROCEDURE — 90834 PSYTX W PT 45 MINUTES: CPT | Mod: 95

## 2023-03-04 NOTE — PLAN
[Cognitive Processing Therapy] : Cognitive Processing Therapy  [Recommended Frequency of Visits: ____] : Recommended frequency of visits: [unfilled] [Return in ____ week(s)] : Return in [unfilled] week(s) [FreeTextEntry2] : \par Problem 1: Re-experiencing sx (intrusive memories, flashbacks, reactivity to triggers)\par Goal 1: 30% reduction in re-experiencing sx \par \par Problem 2: Anxiety, overwhelm, and fogginess\par Goal 2: 30% reduction in anxiety, overwhelm, and fogginess\par \par Problem 3: Negative emotions (e.g., anger, guilt, sadness) and moral injury\par Goal 3: 30% reduction in negative emotions and moral injury.  [de-identified] : Session started late due to technical difficulties. This was the conclusion of CPT Session 7 and the start of CPT session 8. Pt reported work has been very busy and she is nervous to have a medical procedure today. Pt completed the HW to read the module on "safety" issues. Writer and pt discussed it and found that the pt had no stuck points in terms of her safety beliefs about self and others, and that in fact her thoughts about safety were quite balanced and reasonable. Pt was engaged with this discussion. Pt continued to reflect on work and how her people-pleasing nature affects her management style. 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 At end of session, writer introduced the next theme in CPT, which is "trust." HW was assigned for pt to read the module on trust issues prior to next session. [FreeTextEntry1] : Continue weekly individual therapy. Complete Session 8 of CPT next week. Continue to monitor sx and measure progress using self-report measures and HW.

## 2023-03-04 NOTE — PHYSICAL EXAM
[Well groomed] : well groomed [Average] : average [Cooperative] : cooperative [Anxious] : anxious [Full] : full [Clear] : clear [Linear/Goal Directed] : linear/goal directed [None] : none [None Reported] : none reported [WNL] : within normal limits [Above average] : above average [FreeTextEntry1] : casually dressed [FreeTextEntry8] : thoughtful, pleasant [de-identified] : mood-congruent, anxious [de-identified] : normal rate/rhythm/volume [FreeTextEntry7] : focused on work [de-identified] : good [de-identified] : good

## 2023-03-10 ENCOUNTER — APPOINTMENT (OUTPATIENT)
Dept: PSYCHIATRY | Facility: CLINIC | Age: 58
End: 2023-03-10
Payer: COMMERCIAL

## 2023-03-10 PROCEDURE — 90837 PSYTX W PT 60 MINUTES: CPT | Mod: 95

## 2023-03-10 NOTE — PLAN
[Cognitive Processing Therapy] : Cognitive Processing Therapy  [Recommended Frequency of Visits: ____] : Recommended frequency of visits: [unfilled] [Return in ____ week(s)] : Return in [unfilled] week(s) [FreeTextEntry2] : \par Problem 1: Re-experiencing sx (intrusive memories, flashbacks, reactivity to triggers)\par Goal 1: 30% reduction in re-experiencing sx \par \par Problem 2: Anxiety, overwhelm, and fogginess\par Goal 2: 30% reduction in anxiety, overwhelm, and fogginess\par \par Problem 3: Negative emotions (e.g., anger, guilt, sadness) and moral injury\par Goal 3: 30% reduction in negative emotions and moral injury.  [de-identified] : This was the conclusion of CPT Session 8 and the start of CPT session 9. Pt reported she got through her medical procedure, and work is stressful as they await joint commission. Pt stated multiple coworkers and colleagues have recently either  or received a terminal dx. Pt stated this news made her feel sad and down, but of note she was not consumed or completely depressed by these things, as she would have been in the past. Pt completed the HW to read the module on "trust" issues. Pt displayed mostly balanced and reasonable thinking when it came to trust. She had a trust stuck point about authority but it was not that troubling to her, so decision was made to move on to the next module. At end of session, writer introduced the next theme in CPT, which is "power and control." HW was assigned for pt to read the module on power and control issues prior to next session. [FreeTextEntry1] : Continue weekly individual therapy. Complete Session 9 of CPT next week. Continue to monitor sx and measure progress using self-report measures and HW.

## 2023-03-10 NOTE — PHYSICAL EXAM
[Well groomed] : well groomed [Average] : average [Cooperative] : cooperative [Anxious] : anxious [Full] : full [Clear] : clear [Linear/Goal Directed] : linear/goal directed [None] : none [None Reported] : none reported [WNL] : within normal limits [Above average] : above average [FreeTextEntry8] : thoughtful, pleasant, less sad and more upbeat [FreeTextEntry1] : casually dressed [de-identified] : mood-congruent, anxious, brighter [de-identified] : normal rate/rhythm/volume [FreeTextEntry7] : focused on work [de-identified] : good [de-identified] : good

## 2023-03-11 ENCOUNTER — NON-APPOINTMENT (OUTPATIENT)
Age: 58
End: 2023-03-11

## 2023-03-16 ENCOUNTER — APPOINTMENT (OUTPATIENT)
Dept: PSYCHIATRY | Facility: CLINIC | Age: 58
End: 2023-03-16
Payer: COMMERCIAL

## 2023-03-16 PROCEDURE — 90837 PSYTX W PT 60 MINUTES: CPT | Mod: 95

## 2023-03-16 NOTE — PLAN
[Cognitive Processing Therapy] : Cognitive Processing Therapy  [Recommended Frequency of Visits: ____] : Recommended frequency of visits: [unfilled] [Return in ____ week(s)] : Return in [unfilled] week(s) [FreeTextEntry2] : \par Problem 1: Re-experiencing sx (intrusive memories, flashbacks, reactivity to triggers)\par Goal 1: 30% reduction in re-experiencing sx \par \par Problem 2: Anxiety, overwhelm, and fogginess\par Goal 2: 30% reduction in anxiety, overwhelm, and fogginess\par \par Problem 3: Negative emotions (e.g., anger, guilt, sadness) and moral injury\par Goal 3: 30% reduction in negative emotions and moral injury.  [de-identified] : This was the continuation of CPT session 9. Session focused on providing feedback and reviewing progress. Pt reported work has been stressful as they wait for joint commission and do performance evaluations. Pt stated she was able to request a work-from-home day to do the evaluations, which was a positive step for her in asserting herself and voicing her needs. Writer provided feedback that the pt's scores on assessment measures have dropped significantly; her PCL-5 has fallen from roughly 50 to about 10 last week, and PHQ-9 from 20 down to 5. Pt was very pleased with her progress. Writer and pt reflected on ways that her perspective has changed, and the positive effects this has had on her mood and functioning. Writer reinforced the pt's hard work in tx. Pt completed the HW to read the module on power and control issues. Plan was made to discuss the module during next session. [FreeTextEntry1] : Continue weekly individual therapy. Complete Session 9 of CPT next week. Continue to monitor sx and measure progress using self-report measures and HW.

## 2023-03-16 NOTE — PHYSICAL EXAM
[Well groomed] : well groomed [Average] : average [Cooperative] : cooperative [Anxious] : anxious [Full] : full [Clear] : clear [Linear/Goal Directed] : linear/goal directed [None] : none [None Reported] : none reported [WNL] : within normal limits [Above average] : above average [FreeTextEntry1] : casually dressed [FreeTextEntry8] : stressed but pleasant [de-identified] : mood-congruent, anxious, more energetic [de-identified] : normal rate/rhythm/volume [FreeTextEntry7] : focused on work [de-identified] : good [de-identified] : good

## 2023-03-24 ENCOUNTER — NON-APPOINTMENT (OUTPATIENT)
Age: 58
End: 2023-03-24

## 2023-03-24 ENCOUNTER — APPOINTMENT (OUTPATIENT)
Dept: PSYCHIATRY | Facility: CLINIC | Age: 58
End: 2023-03-24

## 2023-03-30 ENCOUNTER — APPOINTMENT (OUTPATIENT)
Dept: INTERNAL MEDICINE | Facility: CLINIC | Age: 58
End: 2023-03-30
Payer: COMMERCIAL

## 2023-03-30 VITALS
WEIGHT: 164 LBS | HEIGHT: 61 IN | OXYGEN SATURATION: 98 % | DIASTOLIC BLOOD PRESSURE: 78 MMHG | TEMPERATURE: 98.9 F | HEART RATE: 87 BPM | BODY MASS INDEX: 30.96 KG/M2 | SYSTOLIC BLOOD PRESSURE: 125 MMHG

## 2023-03-30 DIAGNOSIS — D12.6 BENIGN NEOPLASM OF COLON, UNSPECIFIED: ICD-10-CM

## 2023-03-30 DIAGNOSIS — Z23 ENCOUNTER FOR IMMUNIZATION: ICD-10-CM

## 2023-03-30 PROCEDURE — 90677 PCV20 VACCINE IM: CPT

## 2023-03-30 PROCEDURE — 99214 OFFICE O/P EST MOD 30 MIN: CPT | Mod: 25

## 2023-03-30 PROCEDURE — G0009: CPT

## 2023-03-30 PROCEDURE — 36415 COLL VENOUS BLD VENIPUNCTURE: CPT

## 2023-03-30 RX ORDER — AMOXICILLIN AND CLAVULANATE POTASSIUM 875; 125 MG/1; MG/1
875-125 TABLET, COATED ORAL
Qty: 14 | Refills: 0 | Status: DISCONTINUED | COMMUNITY
Start: 2023-01-26 | End: 2023-03-30

## 2023-03-30 NOTE — HISTORY OF PRESENT ILLNESS
[de-identified] : f/u DM, HLD, TObacco use, mood.\par \par Last seen about 9 months ago\par \par Continues to work with  team therapist\par Also using an online platform - breathe, move, nourish, including coaching calls and check in sessions - which she is enjoying\par \par Diagnosed with endometrial polyp - has a sonohysterogram, very painful, had a vasovagal epsisode\par seeing sternchos at the end of the months\par At last labs, A1cx was 6.5 Lab Facility: [id number] Lab Facility: [id number]

## 2023-03-30 NOTE — ASSESSMENT
[FreeTextEntry1] : 55 y/o female for f/u\par \par Endometrial polyp:\par seeing Sternchos in near future\par \par Pulm:  Lung CT screening due July 2023, order placed\par \par TObacco Use:  Cessation advised\par -prevnar 20 advised, given today\par \par HLD: last , on rosuva 5, repeat today\par \par DM: last a1c 6.5, repeat today\par continue healthy lifestyle changes\par \par Derm f/u for mole on left side of forehead\par \par Depression/Anxiety: stable continues in care with  as noted in HPI\par ZOloft 300 daily, \par xanax 0.5mg prn, uses about daily\par \par \par \par HCM: \par -labs as noted\par -mammo July 2022, new study ordered\par -CLS due 2023 (last 2018 with bárbara, premalig lesions - tub adenoma)\par -PAP normal Nov 2022\par \par \par

## 2023-03-31 ENCOUNTER — APPOINTMENT (OUTPATIENT)
Dept: PSYCHIATRY | Facility: CLINIC | Age: 58
End: 2023-03-31
Payer: COMMERCIAL

## 2023-03-31 LAB
ALBUMIN SERPL ELPH-MCNC: 4.6 G/DL
ALP BLD-CCNC: 74 U/L
ALT SERPL-CCNC: 18 U/L
ANION GAP SERPL CALC-SCNC: 12 MMOL/L
AST SERPL-CCNC: 20 U/L
BASOPHILS # BLD AUTO: 0.07 K/UL
BASOPHILS NFR BLD AUTO: 0.7 %
BILIRUB SERPL-MCNC: 0.2 MG/DL
BUN SERPL-MCNC: 12 MG/DL
CALCIUM SERPL-MCNC: 9.6 MG/DL
CHLORIDE SERPL-SCNC: 102 MMOL/L
CHOLEST SERPL-MCNC: 239 MG/DL
CO2 SERPL-SCNC: 24 MMOL/L
CREAT SERPL-MCNC: 0.55 MG/DL
EGFR: 108 ML/MIN/1.73M2
EOSINOPHIL # BLD AUTO: 0.09 K/UL
EOSINOPHIL NFR BLD AUTO: 0.9 %
ESTIMATED AVERAGE GLUCOSE: 151 MG/DL
GLUCOSE SERPL-MCNC: 124 MG/DL
HBA1C MFR BLD HPLC: 6.9 %
HCT VFR BLD CALC: 40.7 %
HDLC SERPL-MCNC: 46 MG/DL
HGB BLD-MCNC: 13.8 G/DL
IMM GRANULOCYTES NFR BLD AUTO: 0.4 %
LDLC SERPL CALC-MCNC: 152 MG/DL
LYMPHOCYTES # BLD AUTO: 2.72 K/UL
LYMPHOCYTES NFR BLD AUTO: 26.7 %
MAN DIFF?: NORMAL
MCHC RBC-ENTMCNC: 29.6 PG
MCHC RBC-ENTMCNC: 33.9 GM/DL
MCV RBC AUTO: 87.3 FL
MONOCYTES # BLD AUTO: 0.49 K/UL
MONOCYTES NFR BLD AUTO: 4.8 %
NEUTROPHILS # BLD AUTO: 6.79 K/UL
NEUTROPHILS NFR BLD AUTO: 66.5 %
NONHDLC SERPL-MCNC: 193 MG/DL
PLATELET # BLD AUTO: 261 K/UL
POTASSIUM SERPL-SCNC: 4.1 MMOL/L
PROT SERPL-MCNC: 7.1 G/DL
RBC # BLD: 4.66 M/UL
RBC # FLD: 13.2 %
SODIUM SERPL-SCNC: 138 MMOL/L
TRIGL SERPL-MCNC: 201 MG/DL
TSH SERPL-ACNC: 0.9 UIU/ML
WBC # FLD AUTO: 10.2 K/UL

## 2023-03-31 PROCEDURE — 90837 PSYTX W PT 60 MINUTES: CPT | Mod: 95

## 2023-04-01 NOTE — PLAN
[Cognitive Processing Therapy] : Cognitive Processing Therapy  [Recommended Frequency of Visits: ____] : Recommended frequency of visits: [unfilled] [Return in ____ week(s)] : Return in [unfilled] week(s) [FreeTextEntry2] : \par Problem 1: Re-experiencing sx (intrusive memories, flashbacks, reactivity to triggers)\par Goal 1: 30% reduction in re-experiencing sx \par \par Problem 2: Anxiety, overwhelm, and fogginess\par Goal 2: 30% reduction in anxiety, overwhelm, and fogginess\par \par Problem 3: Negative emotions (e.g., anger, guilt, sadness) and moral injury\par Goal 3: 30% reduction in negative emotions and moral injury.  [FreeTextEntry1] : Continue weekly individual therapy. Complete Session 9 of CPT next week. Continue to monitor sx and measure progress using self-report measures and HW.  [de-identified] : Session focused on recent loss, therefore CPT was put on hold. Pt described the recent loss of her maternal aunt in [place]. Pt was close to this aunt, however she could not travel last minute to the , and she missed the live stream due to a miscommunication. Pt described what she heard about her aunt's final moments and . Pt stated she feels that her aunt is at peace. Pt expressed sadness as her mother was 1 of 8 siblings and only 2 are left now. Pt described the end of her aunt's life, feeling conflicted as her mother was helping to take care of her, but at the same time abusing alcohol. Pt discussed how she and her siblings worked with her mother to make the commitment to go to inpatient rehab. Pt expressed feeling proud of her mother for taking this step. For HW, writer encouraged the pt to review the module on power and control issues again prior to the next session.

## 2023-04-01 NOTE — PHYSICAL EXAM
[Well groomed] : well groomed [Average] : average [Cooperative] : cooperative [Anxious] : anxious [Full] : full [Linear/Goal Directed] : linear/goal directed [Clear] : clear [None] : none [None Reported] : none reported [WNL] : within normal limits [Above average] : above average [FreeTextEntry1] : casually dressed [FreeTextEntry8] : sad [de-identified] : normal rate/rhythm/volume [de-identified] : mood-congruent, anxious [FreeTextEntry7] : focused on loss of aunt [de-identified] : good [de-identified] : good

## 2023-04-07 ENCOUNTER — APPOINTMENT (OUTPATIENT)
Dept: PSYCHIATRY | Facility: CLINIC | Age: 58
End: 2023-04-07
Payer: COMMERCIAL

## 2023-04-07 PROCEDURE — 90834 PSYTX W PT 45 MINUTES: CPT | Mod: 95

## 2023-04-08 NOTE — PHYSICAL EXAM
[Well groomed] : well groomed [Average] : average [Cooperative] : cooperative [Anxious] : anxious [Full] : full [Clear] : clear [Linear/Goal Directed] : linear/goal directed [None] : none [None Reported] : none reported [WNL] : within normal limits [Above average] : above average [FreeTextEntry1] : casually dressed [FreeTextEntry8] : tired, a little sad and worried but pleasant [de-identified] : mood-congruent, anxious [de-identified] : normal rate/rhythm/volume [FreeTextEntry7] : focused on her mother and on work [de-identified] : good [de-identified] : good

## 2023-04-08 NOTE — PLAN
[Cognitive Processing Therapy] : Cognitive Processing Therapy  [Recommended Frequency of Visits: ____] : Recommended frequency of visits: [unfilled] [Return in ____ week(s)] : Return in [unfilled] week(s) [FreeTextEntry2] : \par Problem 1: Re-experiencing sx (intrusive memories, flashbacks, reactivity to triggers)\par Goal 1: 30% reduction in re-experiencing sx \par \par Problem 2: Anxiety, overwhelm, and fogginess\par Goal 2: 30% reduction in anxiety, overwhelm, and fogginess\par \par Problem 3: Negative emotions (e.g., anger, guilt, sadness) and moral injury\par Goal 3: 30% reduction in negative emotions and moral injury.  [de-identified] : Pt was late to session. This was the conclusion of CPT Session 9 and the start of CPT session 10. Pt reported feeling tired as she is working a lot to prepare for joint commission. Pt discussed the work schedule and how her caring and people-pleasing nature has led her to work more shifts. However pt was pleased to receive a very positive performance evaluation. Otherwise the pt stated her mother is in rehab, and she reflected on her feelings about it. Pt completed the HW to read the module on "power and control" issues. Pt displayed balanced and reasonable thinking when it came to power and control. She expressed feeling controlled by authority at times, however the beliefs were not to an extreme and were not stuck points. At end of session, writer introduced the next theme in CPT, which is "esteem." HW was assigned for pt to read the module on esteem issues prior to next session.  [FreeTextEntry1] : Continue weekly individual therapy. Complete Session 10 of CPT next week. Continue to monitor sx and measure progress using self-report measures and HW.

## 2023-04-13 ENCOUNTER — APPOINTMENT (OUTPATIENT)
Dept: PSYCHIATRY | Facility: CLINIC | Age: 58
End: 2023-04-13
Payer: COMMERCIAL

## 2023-04-13 PROCEDURE — 90837 PSYTX W PT 60 MINUTES: CPT | Mod: 95

## 2023-04-13 NOTE — PLAN
[Cognitive Processing Therapy] : Cognitive Processing Therapy  [Recommended Frequency of Visits: ____] : Recommended frequency of visits: [unfilled] [Return in ____ week(s)] : Return in [unfilled] week(s) [FreeTextEntry2] : \par Problem 1: Re-experiencing sx (intrusive memories, flashbacks, reactivity to triggers)\par Goal 1: 30% reduction in re-experiencing sx \par \par Problem 2: Anxiety, overwhelm, and fogginess\par Goal 2: 30% reduction in anxiety, overwhelm, and fogginess\par \par Problem 3: Negative emotions (e.g., anger, guilt, sadness) and moral injury\par Goal 3: 30% reduction in negative emotions and moral injury.  [de-identified] : This was the conclusion of CPT Session 10. Pt reported feeling tired and depressed, she remains sad and worried about her mother and stressed about work, anticipating joint commission next week. Pt stated she cancelled plans with a friend and cousin due to feeling too down to go out, and worried about disrupting her sleep schedule. Pt reported she then felt guilty for cancelling, and angry with herself for allowing work to be so consuming. Pt completed the HW to read the module on "esteem" issues. Though the pt did not have high esteem for herself or others, none of her esteem beliefs were extreme or stuck points. HW was assigned for the pt to do "nice things" for herself, and to practice giving and receiving compliments until the next session. Pt generated ideas for "nice things," and she stated she does not take compliments well, so this will be a challenge.  [FreeTextEntry1] : Continue weekly individual therapy. Next session will be Session 11 of CPT. Continue to monitor sx and measure progress using self-report measures and HW. \par \par Writer is out of the office for the next 2 Fridays, and the pt cannot meet on other days due to work. Therefore the next session will be held in 3 weeks.

## 2023-04-13 NOTE — PHYSICAL EXAM
[Well groomed] : well groomed [Average] : average [Cooperative] : cooperative [Depressed] : depressed [Anxious] : anxious [Full] : full [Clear] : clear [Linear/Goal Directed] : linear/goal directed [None Reported] : none reported [WNL] : within normal limits [Above average] : above average [Preoccupations/Ruminations] : preoccupations/ruminations [FreeTextEntry1] : casually dressed [FreeTextEntry8] : tired, sad, guilty [de-identified] : mood-congruent, anxious [de-identified] : normal rate/rhythm/volume [FreeTextEntry7] : focused on her mother and on work [de-identified] : good [de-identified] : good

## 2023-04-18 ENCOUNTER — APPOINTMENT (OUTPATIENT)
Dept: OBGYN | Facility: CLINIC | Age: 58
End: 2023-04-18
Payer: COMMERCIAL

## 2023-04-18 VITALS
BODY MASS INDEX: 31.53 KG/M2 | DIASTOLIC BLOOD PRESSURE: 87 MMHG | SYSTOLIC BLOOD PRESSURE: 138 MMHG | WEIGHT: 167 LBS | HEIGHT: 61 IN

## 2023-04-18 DIAGNOSIS — N88.2 STRICTURE AND STENOSIS OF CERVIX UTERI: ICD-10-CM

## 2023-04-18 PROCEDURE — 99203 OFFICE O/P NEW LOW 30 MIN: CPT

## 2023-04-25 NOTE — HISTORY OF PRESENT ILLNESS
[FreeTextEntry1] : 58 yo  presents as a new pt for consultation regarding endometrial polyp. LMP 2014, post menopausal. \par \par OBH: None\par PMH: High cholesterol - on Zocor\par PSH: None\par FH: uterine/cervical - Mother; Pancreatic - Aunt (Paternal)\par Medications: Zocor\par Social history: Smoker\par \par Sonohysterogram done on 3/3/2023 revealed:\par \par The endometrial walls measure 102 mm each. There is a small endometrial polyp measuring 0.9 x 0.8 x 0.3 \par \par Impression: \par Small endometrial polyp\par \par Pelvic US done on 2023:\par \par Findings: \par Uterus 3.6 cm x 2.0 cm x 3.6. Within normal limits. Nabothian cyst is incidentally noted.\par Endometrium 3mm. the endometrial echo complex is heterogenous, with suggestion of small cystic spaces. \par \par Right ovary: 1.6 cm x 1.5 cm x 1.6 cm. Within normal limits. Normal arterial and venous waveforms \par Left ovary: 1.8 cm x 1.1 cm x 1.7 cm. Within normal limits. Normal arterial and venous waveforms \par \par Fluid: None\par \par Impression:\par \par Normal thickness endometrial stripe, though with some heterogenelty and suggestion of small cystic spaces. If the patient is bleeding at the time of examination, this may account for this appearance. Otherwise, this may repreent underlying endometrial pathology. The endometrium would be better evaulated with sonhysterogram, which is alos more sensitive for evaulation of uterine polyps. \par \par Unremarkable sonographic appearance of the bilateral ovaries for age. \par  [TextBox_4] : Consult regarding uterine polyp [PapSmeardate] : 11/2022 [LMPDate] : 2/2014

## 2023-04-25 NOTE — PLAN
[FreeTextEntry1] : 56 yo  for consultation regarding endometrial polyp.\par \par -Discussed the surgical management of endometrial polyps at length with patient. Risks/benefits and alternatives of operative hysteroscopy and polyp resection were reviewed including but not limited to risk of infection, hemorrhage and perforation with concomitant risks of injury to abdominal organs (bladder, bowel, GYN and vascular structures). Remote risk of fluid overload reported. Discussed the need to determine pathology of polyp to r/o malignant potential.\par -book D&C, hysteroscopy, polypecetomy\par -needs medical clearance from PCP\par \par FH cancer\par -recommended genetic screening with Dr. Stevenson

## 2023-04-25 NOTE — CONSULT LETTER
[Dear  ___] : Dear  [unfilled], [Consult Letter:] : I had the pleasure of evaluating your patient, [unfilled]. [( Thank you for referring [unfilled] for consultation for _____ )] : Thank you for referring [unfilled] for consultation for [unfilled] [Please see my note below.] : Please see my note below. [Consult Closing:] : Thank you very much for allowing me to participate in the care of this patient.  If you have any questions, please do not hesitate to contact me. [Sincerely,] : Sincerely, [FreeTextEntry2] : Dr. Susie Aguilera\par [address] \[place] [address] [FreeTextEntry3] : Edgar John MD

## 2023-04-25 NOTE — END OF VISIT
[FreeTextEntry3] : I Miguel Carballo, acted as a scribe on behalf of Dr. Edgar John on 04/18/2023.\par \par All medical entries made by this scribe where at my Dr. Edgar John, direction and personally dictated by me on 04/18/2023.  I have reviewed the chart and agree that the record accurately reflects my personal performance of the history, physical exam, assessment, and plan. I have also personally directed, reviewed and agreed with the chart. [Time Spent: ___ minutes] : I have spent [unfilled] minutes of time on the encounter. [>50% of the face to face encounter time was spent on counseling and/or coordination of care for ___] : Greater than 50% of the face to face encounter time was spent on counseling and/or coordination of care for [unfilled]

## 2023-05-05 ENCOUNTER — APPOINTMENT (OUTPATIENT)
Dept: PSYCHIATRY | Facility: CLINIC | Age: 58
End: 2023-05-05
Payer: COMMERCIAL

## 2023-05-05 PROCEDURE — 90837 PSYTX W PT 60 MINUTES: CPT | Mod: 95

## 2023-05-07 NOTE — PLAN
[Cognitive Processing Therapy] : Cognitive Processing Therapy  [Recommended Frequency of Visits: ____] : Recommended frequency of visits: [unfilled] [Return in ____ week(s)] : Return in [unfilled] week(s) [FreeTextEntry2] : \par Problem 1: Re-experiencing sx (intrusive memories, flashbacks, reactivity to triggers)\par Goal 1: 30% reduction in re-experiencing sx \par \par Problem 2: Anxiety, overwhelm, and fogginess\par Goal 2: 30% reduction in anxiety, overwhelm, and fogginess\par \par Problem 3: Negative emotions (e.g., anger, guilt, sadness) and moral injury\par Goal 3: 30% reduction in negative emotions and moral injury.  [de-identified] : This was CPT Session 11. Pt reported work has been hectic and stressful, but she is relieved that joint commission is over and the review went well. Pt stated she is dealing with a few health concerns and feels nervous for an upcoming medical procedure. Pt completed the HW to do "nice things" for herself, and to practice giving and receiving compliments. Pt reported she received 2 compliments on her appearance; she did not really believe them but she was mindful to try to accept them anyway. Pt stated she also did "nice things" for herself, including stepping outside for a 5-minute break during work, however she was called by her staff and felt guilty about the break. At end of session, writer introduced the next theme in CPT, which is "intimacy." HW was assigned for pt to read the module on intimacy issues prior to next session, as well as to continue with "nice things" and compliments.  [FreeTextEntry1] : Continue weekly individual therapy. Complete Session 11 of CPT next week. Continue to monitor sx and measure progress using self-report measures and HW.

## 2023-05-07 NOTE — PHYSICAL EXAM
[Well groomed] : well groomed [Average] : average [Cooperative] : cooperative [Anxious] : anxious [Full] : full [Clear] : clear [Linear/Goal Directed] : linear/goal directed [None] : none [None Reported] : none reported [WNL] : within normal limits [Above average] : above average [FreeTextEntry1] : casually dressed [FreeTextEntry8] : sad, tired [de-identified] : mood-congruent, anxious [de-identified] : normal rate/rhythm/volume [FreeTextEntry7] : focused on work and her health [de-identified] : good [de-identified] : good

## 2023-05-08 ENCOUNTER — OUTPATIENT (OUTPATIENT)
Dept: OUTPATIENT SERVICES | Facility: HOSPITAL | Age: 58
LOS: 1 days | End: 2023-05-08
Payer: COMMERCIAL

## 2023-05-08 VITALS
HEIGHT: 61 IN | OXYGEN SATURATION: 98 % | RESPIRATION RATE: 16 BRPM | HEART RATE: 85 BPM | WEIGHT: 162.92 LBS | TEMPERATURE: 99 F | DIASTOLIC BLOOD PRESSURE: 68 MMHG | SYSTOLIC BLOOD PRESSURE: 103 MMHG

## 2023-05-08 DIAGNOSIS — N84.0 POLYP OF CORPUS UTERI: ICD-10-CM

## 2023-05-08 DIAGNOSIS — Z01.818 ENCOUNTER FOR OTHER PREPROCEDURAL EXAMINATION: ICD-10-CM

## 2023-05-08 LAB
ANION GAP SERPL CALC-SCNC: 14 MMOL/L — SIGNIFICANT CHANGE UP (ref 5–17)
BLD GP AB SCN SERPL QL: NEGATIVE — SIGNIFICANT CHANGE UP
BUN SERPL-MCNC: 14 MG/DL — SIGNIFICANT CHANGE UP (ref 7–23)
CALCIUM SERPL-MCNC: 9.8 MG/DL — SIGNIFICANT CHANGE UP (ref 8.4–10.5)
CHLORIDE SERPL-SCNC: 101 MMOL/L — SIGNIFICANT CHANGE UP (ref 96–108)
CO2 SERPL-SCNC: 22 MMOL/L — SIGNIFICANT CHANGE UP (ref 22–31)
CREAT SERPL-MCNC: 0.48 MG/DL — LOW (ref 0.5–1.3)
EGFR: 111 ML/MIN/1.73M2 — SIGNIFICANT CHANGE UP
GLUCOSE SERPL-MCNC: 152 MG/DL — HIGH (ref 70–99)
HCT VFR BLD CALC: 41.6 % — SIGNIFICANT CHANGE UP (ref 34.5–45)
HGB BLD-MCNC: 14.3 G/DL — SIGNIFICANT CHANGE UP (ref 11.5–15.5)
MCHC RBC-ENTMCNC: 29.8 PG — SIGNIFICANT CHANGE UP (ref 27–34)
MCHC RBC-ENTMCNC: 34.4 GM/DL — SIGNIFICANT CHANGE UP (ref 32–36)
MCV RBC AUTO: 86.7 FL — SIGNIFICANT CHANGE UP (ref 80–100)
NRBC # BLD: 0 /100 WBCS — SIGNIFICANT CHANGE UP (ref 0–0)
PLATELET # BLD AUTO: 221 K/UL — SIGNIFICANT CHANGE UP (ref 150–400)
POTASSIUM SERPL-MCNC: 4 MMOL/L — SIGNIFICANT CHANGE UP (ref 3.5–5.3)
POTASSIUM SERPL-SCNC: 4 MMOL/L — SIGNIFICANT CHANGE UP (ref 3.5–5.3)
RBC # BLD: 4.8 M/UL — SIGNIFICANT CHANGE UP (ref 3.8–5.2)
RBC # FLD: 13.2 % — SIGNIFICANT CHANGE UP (ref 10.3–14.5)
RH IG SCN BLD-IMP: POSITIVE — SIGNIFICANT CHANGE UP
SODIUM SERPL-SCNC: 137 MMOL/L — SIGNIFICANT CHANGE UP (ref 135–145)
WBC # BLD: 9.41 K/UL — SIGNIFICANT CHANGE UP (ref 3.8–10.5)
WBC # FLD AUTO: 9.41 K/UL — SIGNIFICANT CHANGE UP (ref 3.8–10.5)

## 2023-05-08 PROCEDURE — 86850 RBC ANTIBODY SCREEN: CPT

## 2023-05-08 PROCEDURE — 86901 BLOOD TYPING SEROLOGIC RH(D): CPT

## 2023-05-08 PROCEDURE — 85027 COMPLETE CBC AUTOMATED: CPT

## 2023-05-08 PROCEDURE — G0463: CPT

## 2023-05-08 PROCEDURE — 86900 BLOOD TYPING SEROLOGIC ABO: CPT

## 2023-05-08 PROCEDURE — 80048 BASIC METABOLIC PNL TOTAL CA: CPT

## 2023-05-08 RX ORDER — SODIUM CHLORIDE 9 MG/ML
3 INJECTION INTRAMUSCULAR; INTRAVENOUS; SUBCUTANEOUS EVERY 8 HOURS
Refills: 0 | Status: DISCONTINUED | OUTPATIENT
Start: 2023-05-15 | End: 2023-05-29

## 2023-05-08 RX ORDER — SODIUM CHLORIDE 9 MG/ML
1000 INJECTION, SOLUTION INTRAVENOUS
Refills: 0 | Status: DISCONTINUED | OUTPATIENT
Start: 2023-05-15 | End: 2023-05-29

## 2023-05-08 NOTE — H&P PST ADULT - PROBLEM SELECTOR PLAN 1
Scheduled for Diagnostic Hysteroscopy, D&C, Polypectomy, Operative Hysteroscopy on 05/15/2023  Labs: cbc, bmp, t&s  Dos: abo     *Per GYN note, patient instructed to take the Misoprostol 200mcg as instructed by Dr. John the night before procedure.     PST instructions provided. Patient verbalized understanding.

## 2023-05-08 NOTE — H&P PST ADULT - ASSESSMENT
DASI score:  8.2 METS   DASI activity: can perform brisk walking, climb 3+ flights of stairs w/o cp or dyspnea.   Loose teeth or denture: No loose teeth    Mallampati: Class 2

## 2023-05-11 ENCOUNTER — APPOINTMENT (OUTPATIENT)
Dept: INTERNAL MEDICINE | Facility: CLINIC | Age: 58
End: 2023-05-11
Payer: COMMERCIAL

## 2023-05-11 ENCOUNTER — APPOINTMENT (OUTPATIENT)
Dept: PSYCHIATRY | Facility: CLINIC | Age: 58
End: 2023-05-11
Payer: COMMERCIAL

## 2023-05-11 ENCOUNTER — NON-APPOINTMENT (OUTPATIENT)
Age: 58
End: 2023-05-11

## 2023-05-11 VITALS
BODY MASS INDEX: 30.58 KG/M2 | SYSTOLIC BLOOD PRESSURE: 109 MMHG | HEIGHT: 61 IN | HEART RATE: 77 BPM | TEMPERATURE: 98.5 F | OXYGEN SATURATION: 97 % | DIASTOLIC BLOOD PRESSURE: 71 MMHG | WEIGHT: 162 LBS

## 2023-05-11 PROCEDURE — 99213 OFFICE O/P EST LOW 20 MIN: CPT

## 2023-05-11 PROCEDURE — 90837 PSYTX W PT 60 MINUTES: CPT | Mod: 95

## 2023-05-11 RX ORDER — AMOXICILLIN AND CLAVULANATE POTASSIUM 875; 125 MG/1; MG/1
875-125 TABLET, COATED ORAL
Qty: 14 | Refills: 0 | Status: DISCONTINUED | COMMUNITY
Start: 2023-04-23 | End: 2023-05-11

## 2023-05-11 RX ORDER — HYDROCORTISONE 25 MG/G
2.5 CREAM TOPICAL
Qty: 1 | Refills: 0 | Status: ACTIVE | COMMUNITY
Start: 2022-12-12

## 2023-05-11 NOTE — PLAN
[Cognitive Processing Therapy] : Cognitive Processing Therapy  [Recommended Frequency of Visits: ____] : Recommended frequency of visits: [unfilled] [Return in ____ week(s)] : Return in [unfilled] week(s) [FreeTextEntry2] : \par Problem 1: Re-experiencing sx (intrusive memories, flashbacks, reactivity to triggers)\par Goal 1: 30% reduction in re-experiencing sx \par \par Problem 2: Anxiety, overwhelm, and fogginess\par Goal 2: 30% reduction in anxiety, overwhelm, and fogginess\par \par Problem 3: Negative emotions (e.g., anger, guilt, sadness) and moral injury\par Goal 3: 30% reduction in negative emotions and moral injury.  [de-identified] : This was the conclusion of CPT Session 11. Pt reported she had medical appointments in preparation for surgery. Pt stated she feels less nervous and more prepared for surgery now. Pt expressed concern that she may be dx with diabetes, but she was accepting of it should this happen, and not in distress. Pt also discussed nurses' week at work. Otherwise, the pt reflected on family dynamics and expressed concern for her mother and brother. Pt completed the HW to read the module on "intimacy" issues. Though she related with some aspects of it, she did not have any intimacy stuck points. Pt also continued to give compliments, particularly to her staff at work, but she stated she did not receive any compliments. Pt is taking time to sit outside in the sunshine as a "nice thing" she does for herself. Given pt's upcoming surgery and a poor connection at end of session, no HW was assigned.  [FreeTextEntry1] : Continue weekly individual therapy. Discuss next steps in CPT during next session. Continue to monitor sx and measure progress using self-report measures and HW.

## 2023-05-11 NOTE — ASSESSMENT
[High Risk Surgery - Intraperitoneal, Intrathoracic or Supringuinal Vascular Procedures] : High Risk Surgery - Intraperitoneal, Intrathoracic or Supringuinal Vascular Procedures - No (0) [Ischemic Heart Disease] : Ischemic Heart Disease - No (0) [Congestive Heart Failure] : Congestive Heart Failure - No (0) [Prior Cerebrovascular Accident or TIA] : Prior Cerebrovascular Accident or TIA - No (0) [Creatinine >= 2mg/dL (1 Point)] : Creatinine >= 2mg/dL - No (0) [Insulin-dependent Diabetic (1 Point)] : Insulin-dependent Diabetic - No (0) [0] : 0 , RCRI Class: I, Risk of Post-Op Cardiac Complications: 3.9%, 95% CI for Risk Estimate: 2.8% - 5.4% [Patient Optimized for Surgery] : Patient optimized for surgery [No Further Testing Recommended] : no further testing recommended [Continue medications as is] : Continue current medications [As per surgery] : as per surgery [FreeTextEntry4] : PST labs reviewed from 5/8/23, unremarkable.\par EKG today - NSR with no ischemia and no change from prior\par

## 2023-05-11 NOTE — HISTORY OF PRESENT ILLNESS
[No Pertinent Cardiac History] : no history of aortic stenosis, atrial fibrillation, coronary artery disease, recent myocardial infarction, or implantable device/pacemaker [Diabetes] : diabetes [(Patient denies any chest pain, claudication, dyspnea on exertion, orthopnea, palpitations or syncope)] : Patient denies any chest pain, claudication, dyspnea on exertion, orthopnea, palpitations or syncope [Smoker] : smoker [Good (7-10 METs)] : Good (7-10 METs) [Asthma] : no asthma [COPD] : no COPD [Sleep Apnea] : no sleep apnea [Self] : no previous adverse anesthesia reaction [Chronic Anticoagulation] : no chronic anticoagulation [Chronic Kidney Disease] : no chronic kidney disease [FreeTextEntry1] : D&C, hysteroscopy, polypecetomy [FreeTextEntry2] : 5/15/23 [FreeTextEntry3] : Dr. John [FreeTextEntry4] : 57 y.o. female, PMHx DM2, depression, anxiety, hyperlipidemia, hydradenitis suppurativa.  \par Presents for preoperative evaluation.  \par Has been getting back into exercise, stairs and strength training.  No dyspnea, no chest pain, no dizziness with activity.  \par Continues to smoke about 1ppd, has smoked for 25 yr.  \par Fax preop to Edgar John [phone]

## 2023-05-11 NOTE — REVIEW OF SYSTEMS
[Negative] : Genitourinary [Fever] : no fever [Chills] : no chills [Night Sweats] : no night sweats [Chest Pain] : no chest pain [Palpitations] : no palpitations [Lower Ext Edema] : no lower extremity edema [Orthopnea] : no orthopnea [Paroxysmal Nocturnal Dyspnea] : no paroxysmal nocturnal dyspnea [Wheezing] : no wheezing [Cough] : no cough [Dyspnea on Exertion] : no dyspnea on exertion [Nausea] : no nausea [Diarrhea] : diarrhea [Dizziness] : no dizziness [Fainting] : no fainting [Easy Bleeding] : no easy bleeding [Easy Bruising] : no easy bruising

## 2023-05-11 NOTE — PHYSICAL EXAM
[Well groomed] : well groomed [Average] : average [Cooperative] : cooperative [Anxious] : anxious [Full] : full [Clear] : clear [Linear/Goal Directed] : linear/goal directed [None] : none [None Reported] : none reported [WNL] : within normal limits [Above average] : above average [FreeTextEntry1] : casually dressed [FreeTextEntry8] : tired, concerned [de-identified] : mood-congruent, anxious [de-identified] : normal rate/rhythm/volume [FreeTextEntry7] : focused on her health, work, and family [de-identified] : good [de-identified] : good

## 2023-05-11 NOTE — PHYSICAL EXAM
[No Acute Distress] : no acute distress [Normal Voice/Communication] : normal voice/communication [No Carotid Bruits] : no carotid bruits [Pedal Pulses Present] : the pedal pulses are present [No Edema] : there was no peripheral edema [Normal Supraclavicular Nodes] : no supraclavicular lymphadenopathy [Normal] : affect was normal and insight and judgment were intact

## 2023-05-11 NOTE — RESULTS/DATA
[] : results reviewed [de-identified] : done 5/8/23 [de-identified] : done 5/8/23 [de-identified] : A1C 6.9  - done 3/2023

## 2023-05-14 ENCOUNTER — TRANSCRIPTION ENCOUNTER (OUTPATIENT)
Age: 58
End: 2023-05-14

## 2023-05-15 ENCOUNTER — TRANSCRIPTION ENCOUNTER (OUTPATIENT)
Age: 58
End: 2023-05-15

## 2023-05-15 ENCOUNTER — RESULT REVIEW (OUTPATIENT)
Age: 58
End: 2023-05-15

## 2023-05-15 ENCOUNTER — APPOINTMENT (OUTPATIENT)
Dept: OBGYN | Facility: HOSPITAL | Age: 58
End: 2023-05-15

## 2023-05-15 ENCOUNTER — OUTPATIENT (OUTPATIENT)
Dept: OUTPATIENT SERVICES | Facility: HOSPITAL | Age: 58
LOS: 1 days | End: 2023-05-15
Payer: COMMERCIAL

## 2023-05-15 VITALS
HEART RATE: 75 BPM | TEMPERATURE: 98 F | OXYGEN SATURATION: 96 % | RESPIRATION RATE: 19 BRPM | SYSTOLIC BLOOD PRESSURE: 96 MMHG | DIASTOLIC BLOOD PRESSURE: 55 MMHG

## 2023-05-15 VITALS
OXYGEN SATURATION: 97 % | WEIGHT: 162.92 LBS | HEIGHT: 61 IN | SYSTOLIC BLOOD PRESSURE: 111 MMHG | DIASTOLIC BLOOD PRESSURE: 74 MMHG | RESPIRATION RATE: 16 BRPM | HEART RATE: 77 BPM | TEMPERATURE: 98 F

## 2023-05-15 DIAGNOSIS — N84.0 POLYP OF CORPUS UTERI: ICD-10-CM

## 2023-05-15 LAB — RH IG SCN BLD-IMP: POSITIVE — SIGNIFICANT CHANGE UP

## 2023-05-15 PROCEDURE — 88305 TISSUE EXAM BY PATHOLOGIST: CPT

## 2023-05-15 PROCEDURE — C1782: CPT

## 2023-05-15 PROCEDURE — 58558 HYSTEROSCOPY BIOPSY: CPT

## 2023-05-15 PROCEDURE — 88305 TISSUE EXAM BY PATHOLOGIST: CPT | Mod: 26

## 2023-05-15 DEVICE — AVETA WAVE PLUS RESECTING DEVICE 3.9MM: Type: IMPLANTABLE DEVICE | Status: FUNCTIONAL

## 2023-05-15 RX ORDER — ONDANSETRON 8 MG/1
4 TABLET, FILM COATED ORAL ONCE
Refills: 0 | Status: DISCONTINUED | OUTPATIENT
Start: 2023-05-15 | End: 2023-05-29

## 2023-05-15 RX ORDER — KETOROLAC TROMETHAMINE 30 MG/ML
30 SYRINGE (ML) INJECTION ONCE
Refills: 0 | Status: DISCONTINUED | OUTPATIENT
Start: 2023-05-15 | End: 2023-05-15

## 2023-05-15 RX ORDER — FENTANYL CITRATE 50 UG/ML
25 INJECTION INTRAVENOUS
Refills: 0 | Status: DISCONTINUED | OUTPATIENT
Start: 2023-05-15 | End: 2023-05-15

## 2023-05-15 RX ORDER — LIDOCAINE HCL 20 MG/ML
0.2 VIAL (ML) INJECTION ONCE
Refills: 0 | Status: COMPLETED | OUTPATIENT
Start: 2023-05-15 | End: 2023-05-15

## 2023-05-15 RX ORDER — IBUPROFEN 200 MG
1 TABLET ORAL
Qty: 0 | Refills: 0 | DISCHARGE

## 2023-05-15 RX ORDER — ALPRAZOLAM 0.25 MG
1 TABLET ORAL
Refills: 0 | DISCHARGE

## 2023-05-15 RX ORDER — SERTRALINE 25 MG/1
3 TABLET, FILM COATED ORAL
Refills: 0 | DISCHARGE

## 2023-05-15 RX ORDER — OXYCODONE HYDROCHLORIDE 5 MG/1
5 TABLET ORAL ONCE
Refills: 0 | Status: DISCONTINUED | OUTPATIENT
Start: 2023-05-15 | End: 2023-05-15

## 2023-05-15 RX ORDER — ACETAMINOPHEN 500 MG
3 TABLET ORAL
Qty: 0 | Refills: 0 | DISCHARGE

## 2023-05-15 RX ORDER — CEFAZOLIN SODIUM 1 G
2000 VIAL (EA) INJECTION ONCE
Refills: 0 | Status: COMPLETED | OUTPATIENT
Start: 2023-05-15 | End: 2023-05-15

## 2023-05-15 RX ORDER — ROSUVASTATIN CALCIUM 5 MG/1
1 TABLET ORAL
Refills: 0 | DISCHARGE

## 2023-05-15 RX ADMIN — SODIUM CHLORIDE 3 MILLILITER(S): 9 INJECTION INTRAMUSCULAR; INTRAVENOUS; SUBCUTANEOUS at 11:12

## 2023-05-15 RX ADMIN — Medication 30 MILLIGRAM(S): at 14:25

## 2023-05-15 RX ADMIN — SODIUM CHLORIDE 100 MILLILITER(S): 9 INJECTION, SOLUTION INTRAVENOUS at 11:13

## 2023-05-15 NOTE — ASU DISCHARGE PLAN (ADULT/PEDIATRIC) - NS MD DC FALL RISK RISK
For information on Fall & Injury Prevention, visit: [email or web address] OR  [email or web address] OR  https://www.cdc.gov/steadi/patient.html

## 2023-05-15 NOTE — ASU DISCHARGE PLAN (ADULT/PEDIATRIC) - ASU DC SPECIAL INSTRUCTIONSFT
Patient Seen in: [facility] Department In [place]      History   Patient presents with:  Abdomen/Flank Pain (GI/)    Stated Complaint: R side abdom pain, sent by [facility]    HPI    Right sided abdominal pain- present for a month- went to [facility] today breast implants   • OTHER  07/11/2017    Kyphoplasty, Dr. Gianfranco Bustamante   • OTHER SURGICAL HISTORY  1949    Cyst in vagina   • OTHER SURGICAL HISTORY  1974    Breast implants   • OTHER SURGICAL HISTORY  2006    Right breast lump- benign   • OTHER SURGICAL HISTORY Resp 16   Temp 97.4 °F (36.3 °C)   Temp src Temporal   SpO2 98 %   O2 Device None (Room air)       Current:/58   Pulse 60   Temp 98.3 °F (36.8 °C) (Temporal)   Resp 16   Ht 157.5 cm (5' 2\")   Wt 53.5 kg   LMP  (LMP Unknown)   SpO2 99%   BMI 21.58 PLATELET    Narrative: The following orders were created for panel order CBC WITH DIFFERENTIAL WITH PLATELET.   Procedure                               Abnormality         Status                     ---------                               ----------- vessels. No evidence of aneurysm. RETROPERITONEUM:  No mass or adenopathy. BOWEL/MESENTERY:  The stomach, duodenum sweep and proximal small bowel are     unremarkable.   There are few segments of small bowel within the right     lower quadrant, in 4/20/2019 and appear unchanged. Mild     pleural-based scarring along the     anterolateral margin of the left lung base is also seen and is stable from     prior imaging. 4. Dense calcifications of the mitral valve annulus are noted.   Unchanged     fr Nothing per vagina for 2 weeks- no douching, tampons, sitz baths, sexual intercourse.  Call your doctor and go to the ER if you experience severe discomfort, chest pain, shortness of breath, fever greater than 100.4, of excessive vaginal bleeding greater than 1 pad/hr for 2 consecutive hours.  Take over the counter medications for pain control as directed. Follow up with your doctor in 2 weeks.

## 2023-05-15 NOTE — BRIEF OPERATIVE NOTE - OPERATION/FINDINGS
EUA: approximately 6 week size uterus, midline. Bilateral adnexa palpable, no masses appreciated.  Tracks in the skin consistent with hidradenitis suppurativa appreciated biltaerally. Small 0.5x0.5cm erythematous and indurated lesion seen to the L of mons pubis. External genitalia otherwise normal in appearance.  Hysteroscopic findings: approximately 1x1cm broad-based endometrial polyp on anterior uterine wall. Bilateral ostia visualized. Atrophic appearing uterine lining.

## 2023-05-15 NOTE — ASU DISCHARGE PLAN (ADULT/PEDIATRIC) - CARE PROVIDER_API CALL
Edgar John)  Obstetrics and Gynecology  [address] [place] [address], [address]  [place] [address]  Phone: [phone]  Fax: [phone]  Established Patient  Scheduled Appointment: 05/30/2023

## 2023-05-15 NOTE — BRIEF OPERATIVE NOTE - DISPOSITION
Spoke with pt. She states she is doing \"good\".    She states her weight is \"up and down\".  Today she is at 190lbs. She states ranges from about 187-191lbs.     She denies swelling, SOB, chest pain, palpitations, dizziness, fever.    Last week she had \"pains in her kidney on the right side\". She states it is gone now.    Has a little bit of a dry cough.    Gets some bloating, but seems to be related to bowel movements. Denies nausea, vomiting, diarrhea, constipation.     Pt instructed to call if symptoms develop/worsen.    PACU, home

## 2023-05-15 NOTE — ASU PATIENT PROFILE, ADULT - FALL HARM RISK - UNIVERSAL INTERVENTIONS
Bed in lowest position, wheels locked, appropriate side rails in place/Call bell, personal items and telephone in reach/Instruct patient to call for assistance before getting out of bed or chair/Non-slip footwear when patient is out of bed/Check to call system/Physically safe environment - no spills, clutter or unnecessary equipment/Purposeful Proactive Rounding/Room/bathroom lighting operational, light cord in reach

## 2023-05-15 NOTE — BRIEF OPERATIVE NOTE - NSICDXBRIEFPROCEDURE_GEN_ALL_CORE_FT
PROCEDURES:  Hysteroscopic polypectomy of uterus with dilation and curettage 15-May-2023 14:03:50  Susie Mcneal

## 2023-05-15 NOTE — ASU DISCHARGE PLAN (ADULT/PEDIATRIC) - NURSING INSTRUCTIONS
Khang received Tylenol Ib at 12:50 in OR. OK to take Tylenol/Acetaminophen at / after _6:50PM_____ for pain and every 6 hours after as needed. OK to take Motrin/Ibuprofen _ for pain and every 6 hours after as needed. Take pain medicines alternate.

## 2023-05-16 ENCOUNTER — NON-APPOINTMENT (OUTPATIENT)
Age: 58
End: 2023-05-16

## 2023-05-18 LAB — SURGICAL PATHOLOGY STUDY: SIGNIFICANT CHANGE UP

## 2023-05-19 ENCOUNTER — APPOINTMENT (OUTPATIENT)
Dept: PSYCHIATRY | Facility: CLINIC | Age: 58
End: 2023-05-19

## 2023-05-22 ENCOUNTER — RX RENEWAL (OUTPATIENT)
Age: 58
End: 2023-05-22

## 2023-05-26 ENCOUNTER — APPOINTMENT (OUTPATIENT)
Dept: PSYCHIATRY | Facility: CLINIC | Age: 58
End: 2023-05-26
Payer: COMMERCIAL

## 2023-05-26 PROCEDURE — 90837 PSYTX W PT 60 MINUTES: CPT | Mod: 95
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
## 2023-05-26 NOTE — PHYSICAL EXAM
[Well groomed] : well groomed [Average] : average [Cooperative] : cooperative [Anxious] : anxious [Full] : full [Clear] : clear [Linear/Goal Directed] : linear/goal directed [None] : none [None Reported] : none reported [WNL] : within normal limits [Above average] : above average [FreeTextEntry1] : casually dressed [FreeTextEntry8] : tired, concerned [de-identified] : mood-congruent, anxious [de-identified] : normal rate/rhythm/volume [FreeTextEntry7] : focused on recent medical procedure and family relationships [de-identified] : good [de-identified] : good

## 2023-05-27 PROBLEM — E78.5 HYPERLIPIDEMIA, UNSPECIFIED: Chronic | Status: ACTIVE | Noted: 2023-05-08

## 2023-05-27 PROBLEM — F32.89 OTHER SPECIFIED DEPRESSIVE EPISODES: Chronic | Status: ACTIVE | Noted: 2023-05-08

## 2023-05-27 PROBLEM — N84.0 POLYP OF CORPUS UTERI: Chronic | Status: ACTIVE | Noted: 2023-05-08

## 2023-05-30 ENCOUNTER — APPOINTMENT (OUTPATIENT)
Dept: OBGYN | Facility: CLINIC | Age: 58
End: 2023-05-30

## 2023-05-31 ENCOUNTER — APPOINTMENT (OUTPATIENT)
Dept: PSYCHIATRY | Facility: CLINIC | Age: 58
End: 2023-05-31
Payer: COMMERCIAL

## 2023-05-31 PROCEDURE — 90837 PSYTX W PT 60 MINUTES: CPT | Mod: 95

## 2023-05-31 NOTE — PHYSICAL EXAM
[Well groomed] : well groomed [Average] : average [Cooperative] : cooperative [Anxious] : anxious [Full] : full [Clear] : clear [Linear/Goal Directed] : linear/goal directed [None] : none [None Reported] : none reported [WNL] : within normal limits [Above average] : above average [FreeTextEntry1] : casually dressed [FreeTextEntry8] : tired, thoughtful, hopeful [de-identified] : mood-congruent, anxious [de-identified] : normal rate/rhythm/volume [FreeTextEntry7] : focused on work and trauma tx [de-identified] : good [de-identified] : good

## 2023-05-31 NOTE — PLAN
[Cognitive Processing Therapy] : Cognitive Processing Therapy  [Recommended Frequency of Visits: ____] : Recommended frequency of visits: [unfilled] [Return in ____ week(s)] : Return in [unfilled] week(s) [FreeTextEntry2] : \par Problem 1: Re-experiencing sx (intrusive memories, flashbacks, reactivity to triggers)\par Goal 1: 30% reduction in re-experiencing sx \par \par Problem 2: Anxiety, overwhelm, and fogginess\par Goal 2: 30% reduction in anxiety, overwhelm, and fogginess\par \par Problem 3: Negative emotions (e.g., anger, guilt, sadness) and moral injury\par Goal 3: 30% reduction in negative emotions and moral injury.  [de-identified] : Session focused on work and reflecting on trauma tx. Pt reported feeling tired after several days of long shifts at work. 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 Pt admitted she feels overall less stressed and burned out lately. Writer and pt reflected on her experience of CPT. Pt reported she no longer has stuck points about the index trauma and is no longer reacting to triggers. Pt stated she has accepted the COVID experience and no longer blames herself for events in the hospital. Writer highlighted the pt's hard work and offered reinforcement. Writer and pt established there are remaining stuck points about other traumas, specifically the loss of her father. Decision was made to complete the CPT protocol first and then work on these stuck points in CPT booster sessions. HW was assigned for pt to write the final Impact Statement prior to the next session.  [FreeTextEntry1] : Continue weekly individual therapy. Next session will be Session 12 of CPT. Continue to monitor sx and measure progress using self-report measures and HW.

## 2023-06-07 ENCOUNTER — APPOINTMENT (OUTPATIENT)
Dept: PSYCHIATRY | Facility: CLINIC | Age: 58
End: 2023-06-07
Payer: COMMERCIAL

## 2023-06-07 PROCEDURE — 90837 PSYTX W PT 60 MINUTES: CPT | Mod: 95

## 2023-06-07 NOTE — REASON FOR VISIT
[Home] : at home, [unfilled] , at the time of the visit. [Medical Office: ([facility])___] : at the medical office located in  [Patient] : Patient [FreeTextEntry1] : "Depression, anxiety, and confusion"

## 2023-06-07 NOTE — PLAN
[Cognitive Processing Therapy] : Cognitive Processing Therapy  [Recommended Frequency of Visits: ____] : Recommended frequency of visits: [unfilled] [Return in ____ week(s)] : Return in [unfilled] week(s) [FreeTextEntry2] : \par Problem 1: Re-experiencing sx (intrusive memories, flashbacks, reactivity to triggers)\par Goal 1: 30% reduction in re-experiencing sx \par \par Problem 2: Anxiety, overwhelm, and fogginess\par Goal 2: 30% reduction in anxiety, overwhelm, and fogginess\par \par Problem 3: Negative emotions (e.g., anger, guilt, sadness) and moral injury\par Goal 3: 30% reduction in negative emotions and moral injury. \par  [de-identified] : This was CPT Session 12. Pt reported her mother will be discharged from inpatient rehab within a few weeks and will start an aftercare program. Pt stated she is relieved but also concerned for her mother's recovery. Pt also expressed feeling distant and far from her family in [place] and [place] in general. Pt completed the HW to write a final Impact Statement describing the perceived cause of her trauma and the impact of her trauma on her thoughts and beliefs about herself, others, and the world. Pt read the final Impact Statement out loud in session, and writer and pt noted changes in her thoughts and feelings since the start of tx (e.g., less guilt and self-blame, more reasonable and balanced thinking about the trauma, higher self-esteem and improved self care as a result). Writer praised pt's hard work in CPT. HW was assigned to retrieve her original Impact Statement and bring it to the next session.  [FreeTextEntry1] : Continue weekly individual therapy. Complete Session 12 of CPT during next session. Continue to monitor sx and measure progress using self-report measures and HW.

## 2023-06-07 NOTE — PHYSICAL EXAM
[Well groomed] : well groomed [Average] : average [Cooperative] : cooperative [Anxious] : anxious [Full] : full [Clear] : clear [Linear/Goal Directed] : linear/goal directed [None] : none [None Reported] : none reported [WNL] : within normal limits [Above average] : above average [FreeTextEntry1] : casually dressed [FreeTextEntry8] : concerned, thoughtful, hopeful [de-identified] : mood-congruent, anxious, pleasant [de-identified] : normal rate/rhythm/volume [FreeTextEntry7] : focused on work and COVID pandemic trauma [de-identified] : good [de-identified] : good

## 2023-06-14 ENCOUNTER — APPOINTMENT (OUTPATIENT)
Dept: OBGYN | Facility: CLINIC | Age: 58
End: 2023-06-14
Payer: COMMERCIAL

## 2023-06-14 VITALS
HEIGHT: 61 IN | WEIGHT: 162 LBS | BODY MASS INDEX: 30.58 KG/M2 | SYSTOLIC BLOOD PRESSURE: 124 MMHG | DIASTOLIC BLOOD PRESSURE: 82 MMHG

## 2023-06-14 DIAGNOSIS — N84.0 POLYP OF CORPUS UTERI: ICD-10-CM

## 2023-06-14 PROCEDURE — 99212 OFFICE O/P EST SF 10 MIN: CPT

## 2023-06-14 NOTE — HISTORY OF PRESENT ILLNESS
[Pain is well-controlled] : pain is well-controlled [Clean/Dry/Intact] : clean, dry and intact [None] : no vaginal bleeding [Normal] : normal [Pathology reviewed] : pathology reviewed [Fever] : no fever [Chills] : no chills [Nausea] : no nausea [Vomiting] : no vomiting [Erythema] : not erythematous [de-identified] : 5/15/23 [de-identified] : hysteroscopy, D&C, polypectomy  [de-identified] : 56 yo pt s/p hysteroscopic polypectomy of uterus with dilation and curettage on 5/15/23. Pt was discharged POD #0. There are no significant complaints. Family history of 3 paternal aunts with breast cancer. \par \par Interoperative note:\par Operative Findings	EUA: approximately 6 week size uterus, midline. Bilateral \par adnexa palpable, no masses appreciated.  Tracks in the skin consistent with \par hidradenitis suppurativa appreciated biltaerally. Small 0.5x0.5cm erythematous \par and indurated lesion seen to the L of mons pubis. External genitalia otherwise \par normal in appearance. \par Hysteroscopic findings: approximately 1x1cm broad-based endometrial polyp on \par anterior uterine wall. Bilateral ostia visualized. Atrophic appearing uterine \par lining. \par \par Pathology: \par Final Diagnosis\par 1.  Endometrial curettings:\par - Inactive endometrium\par \par \par 2.  Endometrial polyp, polypectomy:\par - Endometrial polyp\par Verified by: GREGG GOODWIN\par (Electronic Signature)\par Reported on: 05/18/23 11:58 EDT, [facility], [address] [facility]. [address], [place] [address]\par Phone: [phone]   Fax: [phone]\par

## 2023-06-14 NOTE — CONSULT LETTER
[Dear  ___] : Dear  [unfilled], [Consult Letter:] : I had the pleasure of evaluating your patient, [unfilled]. [Please see my note below.] : Please see my note below. [Consult Closing:] : Thank you very much for allowing me to participate in the care of this patient.  If you have any questions, please do not hesitate to contact me. [Sincerely,] : Sincerely, [FreeTextEntry2] : Susie Aguilera MD\par [address] \[place] [address]  [FreeTextEntry3] : Edgar John MD

## 2023-06-14 NOTE — ASSESSMENT
[Doing Well] : is doing well [Excellent Pain Control] : has excellent pain control [No Sign of Infection] : is showing no signs of infection [de-identified] : 58 yo pt s/p hysteroscopic polypectomy of uterus with dilation and curettage. Stable.

## 2023-06-14 NOTE — PLAN
[FreeTextEntry1] : 58 yo female pt s/p hysteroscopic polypectomy and D&C, path benign\par \par -referral for genetics \par -recommend yearly sono\par -Pt to f/u with Dr. Aguilera for routine GYN care\par

## 2023-06-16 ENCOUNTER — APPOINTMENT (OUTPATIENT)
Dept: PSYCHIATRY | Facility: CLINIC | Age: 58
End: 2023-06-16
Payer: COMMERCIAL

## 2023-06-16 PROCEDURE — 90837 PSYTX W PT 60 MINUTES: CPT | Mod: 95

## 2023-06-16 NOTE — PHYSICAL EXAM
[Well groomed] : well groomed [Average] : average [Cooperative] : cooperative [Anxious] : anxious [Full] : full [Clear] : clear [Linear/Goal Directed] : linear/goal directed [None] : none [None Reported] : none reported [WNL] : within normal limits [Above average] : above average [FreeTextEntry1] : casually dressed [FreeTextEntry8] : worried, thoughtful, proud, hopeful [de-identified] : mood-congruent, anxious, pleasant [de-identified] : normal rate/rhythm/volume [FreeTextEntry7] : focused on family and COVID pandemic trauma [de-identified] : good [de-identified] : good
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
## 2023-06-22 ENCOUNTER — APPOINTMENT (OUTPATIENT)
Dept: INTERNAL MEDICINE | Facility: CLINIC | Age: 58
End: 2023-06-22
Payer: COMMERCIAL

## 2023-06-22 VITALS
BODY MASS INDEX: 29.83 KG/M2 | WEIGHT: 158 LBS | TEMPERATURE: 99.1 F | HEIGHT: 61 IN | SYSTOLIC BLOOD PRESSURE: 117 MMHG | DIASTOLIC BLOOD PRESSURE: 74 MMHG | OXYGEN SATURATION: 96 % | HEART RATE: 87 BPM

## 2023-06-22 DIAGNOSIS — Z86.39 PERSONAL HISTORY OF OTHER ENDOCRINE, NUTRITIONAL AND METABOLIC DISEASE: ICD-10-CM

## 2023-06-22 DIAGNOSIS — Z87.2 PERSONAL HISTORY OF DISEASES OF THE SKIN AND SUBCUTANEOUS TISSUE: ICD-10-CM

## 2023-06-22 DIAGNOSIS — E11.9 TYPE 2 DIABETES MELLITUS W/OUT COMPLICATIONS: ICD-10-CM

## 2023-06-22 DIAGNOSIS — Z72.0 TOBACCO USE: ICD-10-CM

## 2023-06-22 DIAGNOSIS — K04.7 PERIAPICAL ABSCESS W/OUT SINUS: ICD-10-CM

## 2023-06-22 DIAGNOSIS — R51.9 HEADACHE, UNSPECIFIED: ICD-10-CM

## 2023-06-22 DIAGNOSIS — Z87.19 PERSONAL HISTORY OF OTHER DISEASES OF THE DIGESTIVE SYSTEM: ICD-10-CM

## 2023-06-22 DIAGNOSIS — Z86.59 PERSONAL HISTORY OF OTHER MENTAL AND BEHAVIORAL DISORDERS: ICD-10-CM

## 2023-06-22 DIAGNOSIS — E78.5 HYPERLIPIDEMIA, UNSPECIFIED: ICD-10-CM

## 2023-06-22 DIAGNOSIS — Z87.898 PERSONAL HISTORY OF OTHER SPECIFIED CONDITIONS: ICD-10-CM

## 2023-06-22 DIAGNOSIS — M62.830 MUSCLE SPASM OF BACK: ICD-10-CM

## 2023-06-22 PROCEDURE — 99396 PREV VISIT EST AGE 40-64: CPT | Mod: 25

## 2023-06-22 PROCEDURE — 36415 COLL VENOUS BLD VENIPUNCTURE: CPT

## 2023-06-22 RX ORDER — MISOPROSTOL 200 UG/1
200 TABLET ORAL
Qty: 2 | Refills: 1 | Status: DISCONTINUED | COMMUNITY
Start: 2023-04-18 | End: 2023-06-22

## 2023-06-22 NOTE — HISTORY OF PRESENT ILLNESS
[de-identified] : 58 y/o female for f/u\par \par S/p hysteroscopic polypectomy of uterus with dilation and curettage, benign path  5/15, saw Alvaro for post op visit last week.\par \par Continues healthy lifestyle changes, checking in with her emotional state, exercise, healthy eating\par Lost weight (gained some back) and down about 10lbs\par \par Ran out of alprazolam recently so family member refilled it for her\par \par Continues to work with  team with good result

## 2023-06-22 NOTE — HISTORY OF PRESENT ILLNESS
[de-identified] : 58 y/o female for f/u\par \par S/p hysteroscopic polypectomy of uterus with dilation and curettage, benign path  5/15, saw Alvaro for post op visit last week.\par \par Continues healthy lifestyle changes, checking in with her emotional state, exercise, healthy eating\par Lost weight (gained some back) and down about 10lbs\par \par Ran out of alprazolam recently so family member refilled it for her\par \par Continues to work with  team with good result

## 2023-06-22 NOTE — ASSESSMENT
[FreeTextEntry1] : 58 y/o female for  CPE\par \par Pulm:  Lung CT screening due July 2023, order placed, scheduled 7/29\par \par TObacco Use:  Cessation advised\par \par HLD: last , \par rosuva increased from 5 to 10, needs repeat lipids\par \par DM: A1c 6.9, repeat today\par continue healthy lifestyle changes\par may benefit from GLP1 inhibitor if A1c remains elevated\par discussed Mechanism of action, side effects, etc for patien to consider\par \par Derm  - f/u for mole on left side of forehead, scheduled in august\par \par Depression/Anxiety:\par Continues F/u with BH \par ZOloft 300 daily, \par xanax 0.5mg prn, uses about daily\par \par HCM: \par -Prevnar 20 complete\par -Lung CT screening:  Scheduled 7/29, order placed\par -mammo July 2022, scheduled 7/29, ordered\par -CLS due 2023 (last 2018 with bárbara, premalig lesions - tub adenoma) - scheduled with GI in Sept 2023\par -PAP normal Nov 2022\par \par Aware of my practice transition and new providers recommended.

## 2023-06-22 NOTE — ASSESSMENT
[FreeTextEntry1] : 56 y/o female for  CPE\par \par Pulm:  Lung CT screening due July 2023, order placed, scheduled 7/29\par \par TObacco Use:  Cessation advised\par \par HLD: last , \par rosuva increased from 5 to 10, needs repeat lipids\par \par DM: A1c 6.9, repeat today\par continue healthy lifestyle changes\par may benefit from GLP1 inhibitor if A1c remains elevated\par discussed Mechanism of action, side effects, etc for patien to consider\par \par Derm  - f/u for mole on left side of forehead, scheduled in august\par \par Depression/Anxiety:\par Continues F/u with BH \par ZOloft 300 daily, \par xanax 0.5mg prn, uses about daily\par \par HCM: \par -Prevnar 20 complete\par -Lung CT screening:  Scheduled 7/29, order placed\par -mammo July 2022, scheduled 7/29, ordered\par -CLS due 2023 (last 2018 with bárbara, premalig lesions - tub adenoma) - scheduled with GI in Sept 2023\par -PAP normal Nov 2022\par \par Aware of my practice transition and new providers recommended.

## 2023-06-26 LAB
ANION GAP SERPL CALC-SCNC: 14 MMOL/L
BUN SERPL-MCNC: 12 MG/DL
CALCIUM SERPL-MCNC: 10.2 MG/DL
CHLORIDE SERPL-SCNC: 100 MMOL/L
CHOLEST SERPL-MCNC: 168 MG/DL
CO2 SERPL-SCNC: 24 MMOL/L
CREAT SERPL-MCNC: 0.56 MG/DL
EGFR: 107 ML/MIN/1.73M2
ESTIMATED AVERAGE GLUCOSE: 148 MG/DL
GLUCOSE SERPL-MCNC: 105 MG/DL
HBA1C MFR BLD HPLC: 6.8 %
HDLC SERPL-MCNC: 47 MG/DL
LDLC SERPL CALC-MCNC: 94 MG/DL
NONHDLC SERPL-MCNC: 122 MG/DL
POTASSIUM SERPL-SCNC: 4.4 MMOL/L
SODIUM SERPL-SCNC: 139 MMOL/L
TRIGL SERPL-MCNC: 137 MG/DL

## 2023-06-30 ENCOUNTER — APPOINTMENT (OUTPATIENT)
Dept: PSYCHIATRY | Facility: CLINIC | Age: 58
End: 2023-06-30
Payer: COMMERCIAL

## 2023-06-30 PROCEDURE — 90837 PSYTX W PT 60 MINUTES: CPT | Mod: 95

## 2023-06-30 NOTE — PHYSICAL EXAM
[Well groomed] : well groomed [Average] : average [Cooperative] : cooperative [Anxious] : anxious [Full] : full [Clear] : clear [Linear/Goal Directed] : linear/goal directed [None] : none [None Reported] : none reported [WNL] : within normal limits [Above average] : above average [FreeTextEntry1] : casually dressed [FreeTextEntry8] : worried, tired, slightly frustrated [de-identified] : mood-congruent, anxious [de-identified] : normal rate/rhythm/volume [FreeTextEntry7] : focused on work [de-identified] : good [de-identified] : good

## 2023-06-30 NOTE — PLAN
[Cognitive Processing Therapy] : Cognitive Processing Therapy  [Recommended Frequency of Visits: ____] : Recommended frequency of visits: [unfilled] [Return in ____ week(s)] : Return in [unfilled] week(s) [FreeTextEntry2] : \par Problem 1: Re-experiencing sx (intrusive memories, flashbacks, reactivity to triggers)\par Goal 1: 30% reduction in re-experiencing sx \par \par Problem 2: Anxiety, overwhelm, and fogginess\par Goal 2: 30% reduction in anxiety, overwhelm, and fogginess\par \par Problem 3: Negative emotions (e.g., anger, guilt, sadness) and moral injury\par Goal 3: 30% reduction in negative emotions and moral injury. \par  [de-identified] : This was the conclusion of CPT Session 12. Pt reported feeling tired and slightly frustrated about work; she had a difficult week with unexpected visit from the [facility] and an interaction with management that made her feel uncertain about her work. Pt stated though she was disappointed, she did not hold onto her frustration and she could move on from it. Pt described setting a limit so that she would not have to work extra hours, and continuing to engage in self-care. Writer and pt reviewed her PCL-5 and PHQ-9 scores from the start to end of the CPT protocol. Pt was struck by the resolution of PTSD, and she could describe ways in which her depression has changed. Writer praised pt's commitment to tx and gains made in CPT resulting in sx relief. Writer and pt made plan to continue with CPT booster sessions to continue to apply the skills to some unresolved stuck points re her family. [FreeTextEntry1] : Continue weekly individual therapy. Provide CPT booster sessions. Continue to monitor sx and measure progress using self-report measures and HW.

## 2023-07-06 ENCOUNTER — APPOINTMENT (OUTPATIENT)
Dept: INTERNAL MEDICINE | Facility: CLINIC | Age: 58
End: 2023-07-06

## 2023-07-07 ENCOUNTER — APPOINTMENT (OUTPATIENT)
Dept: PSYCHIATRY | Facility: CLINIC | Age: 58
End: 2023-07-07
Payer: COMMERCIAL

## 2023-07-07 PROCEDURE — 90837 PSYTX W PT 60 MINUTES: CPT | Mod: 95

## 2023-07-08 NOTE — PLAN
[Cognitive and/or Behavior Therapy] : Cognitive and/or Behavior Therapy  [Recommended Frequency of Visits: ____] : Recommended frequency of visits: [unfilled] [Return in ____ week(s)] : Return in [unfilled] week(s) [FreeTextEntry2] : \par Problem 1: Re-experiencing sx (intrusive memories, flashbacks, reactivity to triggers)\par Goal 1: 30% reduction in re-experiencing sx \par \par Problem 2: Anxiety, overwhelm, and fogginess\par Goal 2: 30% reduction in anxiety, overwhelm, and fogginess\par \par Problem 3: Negative emotions (e.g., anger, guilt, sadness) and moral injury\par Goal 3: 30% reduction in negative emotions and moral injury. \par  [de-identified] : Session focused on recent depression and coping skills. Pt reported that after having felt better for some weeks, she experienced depressed mood, loneliness, and sadness this past week. Pt expressed confusion as there was no clear trigger. Pt reflected on some possible causes such as a health issue that has resurfaced, feeling worried about her mother, feeling sad for a friend with health issues, and seeing other people's family gatherings on social media. Pt was unsure if these things triggered it, but she shared ways she is trying to improve her mood. Pt stated she started taking walks again and decided [place]. Pt also spoke about how she used to visit the beach to relax and people-watch and it always made her feel better. Pt stated she may try to go back soon. Writer offered empathy and support and reinforced healthy coping and behavioral activation.  [FreeTextEntry1] : Continue weekly individual therapy. Provide CBT and CPT booster sessions. Continue to monitor sx and measure progress using self-report measures and HW. \par \par Due to writer's planned vacation next week, the next session will be held in 2 weeks.

## 2023-07-08 NOTE — PHYSICAL EXAM
[Well groomed] : well groomed [Average] : average [Cooperative] : cooperative [Anxious] : anxious [Full] : full [Clear] : clear [Linear/Goal Directed] : linear/goal directed [None] : none [None Reported] : none reported [WNL] : within normal limits [Above average] : above average [Depressed] : depressed [FreeTextEntry1] : casually dressed [FreeTextEntry8] : confused, sad, lonely [de-identified] : mood-congruent, anxious [de-identified] : normal rate/rhythm/volume [FreeTextEntry7] : focused on recent depression and coping skills [de-identified] : good [de-identified] : good

## 2023-07-19 DIAGNOSIS — B37.31 ACUTE CANDIDIASIS OF VULVA AND VAGINA: ICD-10-CM

## 2023-07-19 RX ORDER — FLUCONAZOLE 150 MG/1
150 TABLET ORAL
Qty: 2 | Refills: 3 | Status: ACTIVE | COMMUNITY
Start: 2023-07-19 | End: 1900-01-01

## 2023-07-21 ENCOUNTER — APPOINTMENT (OUTPATIENT)
Dept: PSYCHIATRY | Facility: CLINIC | Age: 58
End: 2023-07-21
Payer: COMMERCIAL

## 2023-07-21 PROCEDURE — 90837 PSYTX W PT 60 MINUTES: CPT | Mod: 95

## 2023-07-21 NOTE — PLAN
[Cognitive and/or Behavior Therapy] : Cognitive and/or Behavior Therapy  [Recommended Frequency of Visits: ____] : Recommended frequency of visits: [unfilled] [Return in ____ week(s)] : Return in [unfilled] week(s) [FreeTextEntry2] : \par Problem 1: Re-experiencing sx (intrusive memories, flashbacks, reactivity to triggers)\par Goal 1: 30% reduction in re-experiencing sx \par \par Problem 2: Anxiety, overwhelm, and fogginess\par Goal 2: 30% reduction in anxiety, overwhelm, and fogginess\par \par Problem 3: Negative emotions (e.g., anger, guilt, sadness) and moral injury\par Goal 3: 30% reduction in negative emotions and moral injury. \par  [de-identified] : Session focused on recent events and family dynamics. Pt reported she has felt "up and down," a bit disappointed in how her summer is going, and is no longer as excited to visit family in [place] next month. Pt described what she expects to face in [place], with different family demands and little time to relax or have a vacation herself. Pt recognized however that she does not set limits and therefore her family expects a lot from her. She stated setting limits, particularly with her mother, would make her feel too guilty. Pt was not sure how to work on this right now, but she stated she wants to shift her outlook and focus on the positives of the trip, which writer supported. Otherwise the pt reported she pushed herself to attend a [place] and had a great time, felt less sad and lonely as a result. Writer offered empathy and support and reinforced positive thinking and behavioral activation.  [FreeTextEntry1] : Continue weekly individual therapy. Provide CBT and CPT booster sessions. Continue to monitor sx and measure progress using self-report measures and HW.

## 2023-07-21 NOTE — RISK ASSESSMENT
[No] : No [FreeTextEntry8] : Pt did not endorse current SIIP.  Tremfya Counseling: I discussed with the patient the risks of guselkumab including but not limited to immunosuppression, serious infections, and drug reactions.  The patient understands that monitoring is required including a PPD at baseline and must alert us or the primary physician if symptoms of infection or other concerning signs are noted.

## 2023-07-21 NOTE — PHYSICAL EXAM
[Well groomed] : well groomed [Average] : average [Cooperative] : cooperative [Anxious] : anxious [Full] : full [Clear] : clear [Linear/Goal Directed] : linear/goal directed [None] : none [None Reported] : none reported [WNL] : within normal limits [Above average] : above average [FreeTextEntry1] : casually dressed [FreeTextEntry8] : thoughtful, a little sad but optimistic [de-identified] : mood-congruent, anxious [FreeTextEntry7] : focused on recent events and family dynamics  [de-identified] : normal rate/rhythm/volume [de-identified] : good [de-identified] : good

## 2023-07-21 NOTE — REASON FOR VISIT
PSYCHIATRY FOLLOW-UP NOTE      Chief Complaint   Patient presents with   • Medication Problem     gambling with Abilify dose increase          History Of Present Illness:  Alfonso Gudino is a 47 y.o. old male with bipolar 2 mood disorder, hypertension comes in today for follow up, was last seen 3 months ago.  He has been doing good in regards to his mood.  He tried a higher dose of Abilify for about 2 months and had a few episodes of gambling.  He was not comfortable telling me how much money he spent but realized that it could be because of the Abilify so he stopped taking it and has not had any gambling episodes since then.  He has actually been feeling better in regards to his mood without the Abilify which has been noticed by his wife as well.  He denies any reckless or impulsive behaviors without Abilify.  He denies any current depressive symptoms.  He is forward looking and is excited about a trip to [place] to visit his father this week.  He denies any impairments in his work since he has been off Abilify.  He feels that as long as he gets a good night sleep which she does with trazodone he feels better.  He denies any problems with his motivation or concentration and feels that actually without Abilify he is concentrating better.  He denies having thoughts of wanting to hurt himself or others.    Social History:   He was born in [facility] and was raised by his mother primarily.  He and his mother immigrated to [place] when he was 11 or 12 years old.  His mother lives close by and father lives in [place] but he does not have a relationship with him anymore.  He has 3 1/2 siblings but has minimal contact with them.  He has been  x2 and  x1.  He had 4 kids from his first marriage but his son  at the age of 4.  His all 3 daughters live in [place].  He lives in [place] with his wife.  He is employed full-time as a  at an [employer] restaurant at a local [employer].    Substance  "Use:  Alcohol - Denies  Nicotine - Denies  Illicit drugs - Denies    Past Medication Trials:  Abilify 15 mg (effective but caused impulse control disorder - gambling), Seroquel (s/e - \"it messed with my blood cells\")    Medications:  Current Outpatient Prescriptions   Medication Sig Dispense Refill   • traZODone (DESYREL) 100 MG Tab Take 2 Tabs by mouth every bedtime. 180 Tab 0   • losartan (COZAAR) 25 MG Tab Take 1 Tab by mouth every day. (Patient not taking: Reported on 2/25/2019) 30 Tab 11     No current facility-administered medications for this visit.        Review Of Systems:    Constitutional - Negative for fatigue  Respiratory - Negative for shortness of breath, cough  CVS - Negative for chest pain, palpitations  GI - Negative for nausea, vomiting, abdominal pain, diarrhea, constipation  Musculoskeletal - Negative for back pain  Neurological - Negative for headaches  Psychiatric - Negative for depression, hypomania, sleep problems    Physical Examination:  Vital signs: BP (!) 164/85   Pulse 72   Ht 1.651 m (5' 5\")   Wt 97.1 kg (214 lb)   BMI 35.61 kg/m²     Musculoskeletal: Normal gait. No abnormal movements.     Mental Status Evaluation:   General: Middle aged  male, dressed in casual attire, good grooming and hygiene, in no apparent distress, calm and cooperative, good eye contact, no psychomotor agitation or retardation  Orientation: Alert and oriented to person, place and time  Recent and remote memory: Grossly intact  Attention span and concentration: Grossly intact  Speech: Spontaneous, normal rate, rhythm and tone  Thought Process: Linear, logical and goal directed  Thought Content: Denies suicidal or homicidal ideations, intent or plan  Perception: Denies auditory or visual hallucinations. No delusions noted  Associations: Intact  Language: Appropriate  Fund of knowledge and vocabulary: Grossly adequate  Mood: \"am doing good\"  Affect: Euthymic, mood congruent  Insight: Good  Judgment: " Good    Depression screening:  Depression Screen (PHQ-2/PHQ-9) 9/12/2018 5/28/2019   PHQ-2 Total Score 0 0     Interpretation of PHQ-9 Total Score   Score Severity   1-4 No Depression   5-9 Mild Depression   10-14 Moderate Depression   15-19 Moderately Severe Depression   20-27 Severe Depression    Medical Records/Labs/Diagnostic Tests Reviewed:  [place]  records - no prescribed controlled medications found in the last 1 year      Impression:  1.  Bipolar 2 mood disorder - stable  2.  Prediabetes - new problem  3.  Dyslipidemia - new problem    Plan:  1.  Continue Abilify as he had few episodes of gambling with the higher dose.  He stopped taking it a month ago and is actually feeling better without it.  His wife is aware and she has not noticed any changes as well.  He would like to stay off medications at this time.  I have informed him that there is a risk of having hypomanic or depressive episode without medications and he is willing to take that risk at this time.  I have advised him to contact my clinic if he or his wife starts noticing any changes in his mood or behavior.  If he started noticing any mood symptoms will do a trial off Lamictal as he does not recall being on a mood stabilizer in the past  2.  Continue Trazodone 200 mg at bedtime for sleep  3.  I have again advised him to set up an earlier appointment with his PCP to discuss his lab results and to get back on his antihypertensive medication as his blood pressure continues to be high.  I have discussed his elevated A1c and lipid profile and advised lifestyle changes for now.  I did let him know that since he is off an antipsychotic medication that should be helpful as well.    Return to clinic in 3 months or sooner if symptoms worsen    The proposed treatment plan was discussed with the patient who was provided the opportunity to ask questions and make suggestions regarding alternative treatment. Patient verbalized understanding and expressed  agreement with the plan.     Gia Sapp M.D.  05/28/19    This note was created using voice recognition software (Dragon). The accuracy of the dictation is limited by the abilities of the software. I have reviewed the note prior to signing, however some errors in grammar and context are still possible. If you have any questions related to this note please do not hesitate to contact our office.      [Home] : at home, [unfilled] , at the time of the visit. [Other Location: e.g. Home (Enter Location, City,State)___] : at [unfilled] [Patient] : Patient [FreeTextEntry1] : "Depression, anxiety, and confusion"

## 2023-07-27 ENCOUNTER — NON-APPOINTMENT (OUTPATIENT)
Age: 58
End: 2023-07-27

## 2023-07-27 VITALS — BODY MASS INDEX: 29.83 KG/M2 | HEIGHT: 61 IN | WEIGHT: 158 LBS

## 2023-07-27 DIAGNOSIS — F17.200 NICOTINE DEPENDENCE, UNSPECIFIED, UNCOMPLICATED: ICD-10-CM

## 2023-07-27 NOTE — HISTORY OF PRESENT ILLNESS
[Current] : Current [TextBox_13] : Patient is scheduled for a annual LDCT for lung cancer screening. . Chart review performed to confirm eligibility for LDCT. \par \par  No documented personal or family history of lung cancer. No documented s/s of lung cancer.\par Patient is a current smoker with a 32 pack year hx. [PacksperDay] : 1 [N_Years] : 32 [PacksperYear] : 32

## 2023-07-28 ENCOUNTER — APPOINTMENT (OUTPATIENT)
Dept: PSYCHIATRY | Facility: CLINIC | Age: 58
End: 2023-07-28
Payer: COMMERCIAL

## 2023-07-28 PROCEDURE — 90837 PSYTX W PT 60 MINUTES: CPT | Mod: 95

## 2023-07-28 NOTE — PHYSICAL EXAM
[Well groomed] : well groomed [Average] : average [Cooperative] : cooperative [Depressed] : depressed [Anxious] : anxious [Full] : full [Clear] : clear [Linear/Goal Directed] : linear/goal directed [Preoccupations/Ruminations] : preoccupations/ruminations [None Reported] : none reported [WNL] : within normal limits [Above average] : above average [FreeTextEntry1] : casually dressed [FreeTextEntry8] : sad, stressed, disappointed [de-identified] : mood-congruent, anxious [de-identified] : normal rate/rhythm/volume [FreeTextEntry7] : focused on health issues and family concerns [de-identified] : good [de-identified] : good

## 2023-07-28 NOTE — PLAN
[Recommended Frequency of Visits: ____] : Recommended frequency of visits: [unfilled] [Return in ____ week(s)] : Return in [unfilled] week(s) [Supportive Therapy] : Supportive Therapy [FreeTextEntry2] : \par Problem 1: Re-experiencing sx (intrusive memories, flashbacks, reactivity to triggers)\par Goal 1: 30% reduction in re-experiencing sx \par \par Problem 2: Anxiety, overwhelm, and fogginess\par Goal 2: 30% reduction in anxiety, overwhelm, and fogginess\par \par Problem 3: Negative emotions (e.g., anger, guilt, sadness) and moral injury\par Goal 3: 30% reduction in negative emotions and moral injury. \par  [de-identified] : Session focused on health issues and family concerns. Pt reported she is suffering from a flare-up of hidradenitis suppurativa and she started antibiotics. Pt stated this is painful and frustrating, has limited her physical activity and will continue to limit her activities on vacation. Pt reported she smokes 1 ppd and she recognized this exacerbates this condition. Pt stated she wants to quit but she admitted she is ambivalent about it and she shared some of the barriers. Pt reported feeling very depressed this week as her mother started drinking alcohol again. Pt stated this is very painful for her and she thinks about it day and night. Pt wants to find better ways to cope with it but is not sure where to start. Pt stated she is dreading her trip to [place] to visit family since this happened, but she is trying to remain hopeful. Writer offered empathy, support, and a safe space to process recent events.  [FreeTextEntry1] : Continue weekly individual therapy. Provide CBT and CPT booster sessions. Continue to monitor sx and measure progress using self-report measures and HW. \par \par The pt will be away on vacation for the next 2 weeks, therefore the next session will be held in 3 weeks.

## 2023-07-29 ENCOUNTER — APPOINTMENT (OUTPATIENT)
Dept: CT IMAGING | Facility: IMAGING CENTER | Age: 58
End: 2023-07-29
Payer: COMMERCIAL

## 2023-07-29 ENCOUNTER — RESULT REVIEW (OUTPATIENT)
Age: 58
End: 2023-07-29

## 2023-07-29 ENCOUNTER — OUTPATIENT (OUTPATIENT)
Dept: OUTPATIENT SERVICES | Facility: HOSPITAL | Age: 58
LOS: 1 days | End: 2023-07-29
Payer: COMMERCIAL

## 2023-07-29 ENCOUNTER — APPOINTMENT (OUTPATIENT)
Dept: MAMMOGRAPHY | Facility: IMAGING CENTER | Age: 58
End: 2023-07-29
Payer: COMMERCIAL

## 2023-07-29 DIAGNOSIS — Z00.8 ENCOUNTER FOR OTHER GENERAL EXAMINATION: ICD-10-CM

## 2023-07-29 DIAGNOSIS — Z12.2 ENCOUNTER FOR SCREENING FOR MALIGNANT NEOPLASM OF RESPIRATORY ORGANS: ICD-10-CM

## 2023-07-29 PROCEDURE — 71271 CT THORAX LUNG CANCER SCR C-: CPT

## 2023-07-29 PROCEDURE — 77067 SCR MAMMO BI INCL CAD: CPT | Mod: 26

## 2023-07-29 PROCEDURE — 71271 CT THORAX LUNG CANCER SCR C-: CPT | Mod: 26

## 2023-07-29 PROCEDURE — 77063 BREAST TOMOSYNTHESIS BI: CPT

## 2023-07-29 PROCEDURE — 77063 BREAST TOMOSYNTHESIS BI: CPT | Mod: 26

## 2023-07-29 PROCEDURE — 77067 SCR MAMMO BI INCL CAD: CPT

## 2023-08-18 ENCOUNTER — APPOINTMENT (OUTPATIENT)
Dept: PSYCHIATRY | Facility: CLINIC | Age: 58
End: 2023-08-18
Payer: COMMERCIAL

## 2023-08-18 PROCEDURE — 90837 PSYTX W PT 60 MINUTES: CPT | Mod: 95

## 2023-08-18 NOTE — PLAN
[Supportive Therapy] : Supportive Therapy [Recommended Frequency of Visits: ____] : Recommended frequency of visits: [unfilled] [Return in ____ week(s)] : Return in [unfilled] week(s) [FreeTextEntry2] : Problem 1: Re-experiencing sx (intrusive memories, flashbacks, reactivity to triggers) Goal 1: 30% reduction in re-experiencing sx   Problem 2: Anxiety, overwhelm, and fogginess Goal 2: 30% reduction in anxiety, overwhelm, and fogginess  Problem 3: Negative emotions (e.g., anger, guilt, sadness) and moral injury Goal 3: 30% reduction in negative emotions and moral injury.  [de-identified] : Session focused on recent trip to [place] to visit family. Pt reported she had a good trip home and spent a lot of time with her mother, who had stopped drinking and went back to recovery program. Pt stated she could see her mother making progress, which made her feel relieved and less worried about her. Pt reported she was also happy to see her siblings, and the tension between them had dissipated. Pt stated she enjoyed time with her nieces and nephews as well. Finally, pt reported she was missing her father, who passed away, and she could feel his absence when she visited home. Pt stated she [place] which [place]. Pt reflected that overall she had a better time on the trip than expected. Writer offered empathy, support, and a safe space to process recent events.  [FreeTextEntry1] : Continue weekly individual therapy. Provide CBT and CPT booster sessions. Continue to monitor sx and measure progress using self-report measures and HW.

## 2023-08-18 NOTE — PHYSICAL EXAM
[Well groomed] : well groomed [Average] : average [Cooperative] : cooperative [Full] : full [Clear] : clear [Linear/Goal Directed] : linear/goal directed [Preoccupations/Ruminations] : preoccupations/ruminations [None Reported] : none reported [WNL] : within normal limits [Above average] : above average [FreeTextEntry1] : casually dressed [FreeTextEntry8] : still anxious but less depressed [de-identified] : mood-congruent, anxious [de-identified] : normal rate/rhythm/volume [FreeTextEntry7] : focused on recent trip home to visit family [de-identified] : good [de-identified] : good

## 2023-08-22 ENCOUNTER — APPOINTMENT (OUTPATIENT)
Dept: DERMATOLOGY | Facility: CLINIC | Age: 58
End: 2023-08-22
Payer: COMMERCIAL

## 2023-08-22 DIAGNOSIS — Z12.83 ENCOUNTER FOR SCREENING FOR MALIGNANT NEOPLASM OF SKIN: ICD-10-CM

## 2023-08-22 DIAGNOSIS — D18.01 HEMANGIOMA OF SKIN AND SUBCUTANEOUS TISSUE: ICD-10-CM

## 2023-08-22 DIAGNOSIS — L73.2 HIDRADENITIS SUPPURATIVA: ICD-10-CM

## 2023-08-22 DIAGNOSIS — L82.1 OTHER SEBORRHEIC KERATOSIS: ICD-10-CM

## 2023-08-22 DIAGNOSIS — D22.9 MELANOCYTIC NEVI, UNSPECIFIED: ICD-10-CM

## 2023-08-22 PROCEDURE — 99204 OFFICE O/P NEW MOD 45 MIN: CPT

## 2023-08-22 NOTE — ASSESSMENT
[FreeTextEntry1] : #Hidradenitis suppurativa, Villegas stage 1/2 - Located in groin - Prior treatments include doxycycline - Currently using hibiclens and clindamycin - Flares with menses - Patient reports smoking. -Educated. Encouraged smoking cessation. Reviewed that scarred areas likely will not improve even with treatment. Discussed goal is to prevent new lesions, pain, additional damage. -Discussed treatment options including oral and topical antibiotics, intralesional steroids, and surgical treatment modalities. -Continue Hibiclens wash daily and Clindamycin 1% lotion daily to active areas. - Discussed LHR  #Cherry angioma #Seborrheic keratoses, including left temple and upper back #Melanocytic nevi; agminated nevus on right ankle -Educated patient on the benign nature of the lesions. Reassured. The patient will continue to monitor the lesion and return if there are any changes.  #Skin cancer screening Total Body Skin Exam was performed today. No c/f malignancy from skin exam today. -Educated pt to monitor moles for the following changes:  A: asymmetry B: border changes C: color changes (Darker, Multicolored, Whiter) D: diameter greater than a pencil eraser size  E: evolving, or changing mole  -Recommend wearing hats and sun protective clothing or sunscreen (SPF 30+, broad band) daily on your face and entire body (apply sunscreen at least 30 minutes prior to going outside). Reapply sunscreen every 2 hours when outside.  RTC 1 year, sooner PRN

## 2023-08-22 NOTE — PHYSICAL EXAM
[FreeTextEntry3] : AAOx3, NAD, well-appearing / pleasant  Total body skin exam performed within normal limits with the exception of:  - Patient deferred examination of genitalia - Fingernail & Toenail exam limited by opaque nail polish  -scarred tracts in groin; inflammatory papules on labia majora -Scattered, bright red papules on trunk -Scattered tan-brown, waxy, stuck on plaques on the trunk and extremities  -scattered brown macules on face, trunk and extremities with no concerning features on dermoscopy

## 2023-08-22 NOTE — HISTORY OF PRESENT ILLNESS
[FreeTextEntry1] : NPV: HS, moles [de-identified] : [place] LAURENT is a 57 year old female with no p/f hx of NMSC or melanoma here for   1) growths on left temple and back. Asymptomatic. Never been treated.  2) spots on right ankle that was noted by podiatrist. Pt reports its been present for years and unchanging.  3) HS flaring, since menopause. Been using hibiclens and clindamycin. s/p courses of doxycycline. Currently smoking.  4) moles. Present on the trunk and extremities x years. Asymptomatic. No treatments tried. No bleeding growth or itching. Otherwise, no new, evolving, or symptomatic skin lesions.  SH: from [place]

## 2023-08-25 ENCOUNTER — APPOINTMENT (OUTPATIENT)
Dept: PSYCHIATRY | Facility: CLINIC | Age: 58
End: 2023-08-25
Payer: COMMERCIAL

## 2023-08-25 PROCEDURE — 90837 PSYTX W PT 60 MINUTES: CPT | Mod: 95

## 2023-08-25 NOTE — PLAN
[Supportive Therapy] : Supportive Therapy [Recommended Frequency of Visits: ____] : Recommended frequency of visits: [unfilled] [Return in ____ week(s)] : Return in [unfilled] week(s) [FreeTextEntry2] : Problem 1: Re-experiencing sx (intrusive memories, flashbacks, reactivity to triggers) Goal 1: 30% reduction in re-experiencing sx   Problem 2: Anxiety, overwhelm, and fogginess Goal 2: 30% reduction in anxiety, overwhelm, and fogginess  Problem 3: Negative emotions (e.g., anger, guilt, sadness) and moral injury Goal 3: 30% reduction in negative emotions and moral injury.  [de-identified] : Session focused on recent loss at work, and future plans and goals. 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 Pt stated many on the team have been shaken as this was a young and healthy individual who was a devoted HCW and was loved by all. Pt reported she has felt sad about it but has not become consumed or overwhelmed by it. Pt reflected on what it means and the need to take advantage of life since so much is unknown. On that note, pt reported she is going on a long weekend trip next month with her  to see and experience a new place. Pt stated she also has goals to lose weight and quit smoking. Pt also noted she has been missing her late father, and still has some self-blaming thoughts about the way in which he . Writer and pt agreed to revisit this and discuss more during next session, possibly work on this using CPT approach.  [FreeTextEntry1] : Continue weekly individual therapy. Provide CBT and CPT booster sessions. Continue to monitor sx and measure progress using self-report measures and HW.

## 2023-08-25 NOTE — PHYSICAL EXAM
[Well groomed] : well groomed [Average] : average [Cooperative] : cooperative [Anxious] : anxious [Full] : full [Clear] : clear [Linear/Goal Directed] : linear/goal directed [Preoccupations/Ruminations] : preoccupations/ruminations [None Reported] : none reported [WNL] : within normal limits [Above average] : above average [FreeTextEntry1] : casually dressed [FreeTextEntry8] : sad, thoughtful [de-identified] : mood-congruent, anxious [de-identified] : normal rate/rhythm/volume [de-identified] : good [FreeTextEntry7] : focused on recent loss at work, as well as future plans and goals [de-identified] : good

## 2023-09-01 ENCOUNTER — APPOINTMENT (OUTPATIENT)
Dept: PSYCHIATRY | Facility: CLINIC | Age: 58
End: 2023-09-01
Payer: COMMERCIAL

## 2023-09-01 PROCEDURE — 90837 PSYTX W PT 60 MINUTES: CPT | Mod: 95

## 2023-09-01 NOTE — PHYSICAL EXAM
[Well groomed] : well groomed [Average] : average [Cooperative] : cooperative [Anxious] : anxious [Full] : full [Clear] : clear [Linear/Goal Directed] : linear/goal directed [None] : none [None Reported] : none reported [WNL] : within normal limits [Above average] : above average [FreeTextEntry1] : casually dressed [FreeTextEntry8] : thoughtful [de-identified] : mood-congruent, anxious [de-identified] : normal rate/rhythm/volume [FreeTextEntry7] : focused on work and life choices [de-identified] : good [de-identified] : good

## 2023-09-01 NOTE — PLAN
[Other: ____] : [unfilled] [Recommended Frequency of Visits: ____] : Recommended frequency of visits: [unfilled] [Return in ____ week(s)] : Return in [unfilled] week(s) [FreeTextEntry2] : Problem 1: Re-experiencing sx (intrusive memories, flashbacks, reactivity to triggers) Goal 1: 30% reduction in re-experiencing sx   Problem 2: Anxiety, overwhelm, and fogginess Goal 2: 30% reduction in anxiety, overwhelm, and fogginess  Problem 3: Negative emotions (e.g., anger, guilt, sadness) and moral injury Goal 3: 30% reduction in negative emotions and moral injury.  [de-identified] : Session focused on work and life choices. Pt reported she experienced stressors at work this week, but was able to manage them. Pt stated the workload keeps increasing, but she is coping with the changes and looking ahead to [facility]. 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 During second half of session, writer informed the pt of her upcoming maternity leave starting in November. Pt was pleased and expressed happiness for this writer. This raised discussion of pt's own life choices and her struggles with infertility and ultimately not having children. Pt processed her feelings about this, and also shared the meaning she ultimately found in her journey. Writer and pt began to discuss her plans for tx after termination with this writer and agreed to continue this discussion. Writer and pt also agreed to revisit CPT for remaining stuck points during some of the remaining sessions.  [FreeTextEntry1] : Continue weekly individual therapy. Provide CBT and CPT booster sessions. Continue to monitor sx and measure progress using self-report measures and HW.

## 2023-09-08 ENCOUNTER — APPOINTMENT (OUTPATIENT)
Dept: PSYCHIATRY | Facility: CLINIC | Age: 58
End: 2023-09-08
Payer: COMMERCIAL

## 2023-09-08 PROCEDURE — 90837 PSYTX W PT 60 MINUTES: CPT | Mod: 95

## 2023-09-09 NOTE — PHYSICAL EXAM
[Well groomed] : well groomed [Average] : average [Cooperative] : cooperative [Anxious] : anxious [Full] : full [Clear] : clear [Linear/Goal Directed] : linear/goal directed [Preoccupations/Ruminations] : preoccupations/ruminations [None Reported] : none reported [WNL] : within normal limits [Above average] : above average [Depressed] : depressed [FreeTextEntry1] : casually dressed [FreeTextEntry8] : thoughtful, sad [de-identified] : mood-congruent, anxious, slightly tearful at times [de-identified] : normal rate/rhythm/volume [FreeTextEntry7] : focused on loss of father [de-identified] : good [de-identified] : good

## 2023-09-09 NOTE — PLAN
[Cognitive Processing Therapy] : Cognitive Processing Therapy  [Recommended Frequency of Visits: ____] : Recommended frequency of visits: [unfilled] [Return in ____ week(s)] : Return in [unfilled] week(s) [FreeTextEntry2] : Problem 1: Re-experiencing sx (intrusive memories, flashbacks, reactivity to triggers) Goal 1: 30% reduction in re-experiencing sx   Problem 2: Anxiety, overwhelm, and fogginess Goal 2: 30% reduction in anxiety, overwhelm, and fogginess  Problem 3: Negative emotions (e.g., anger, guilt, sadness) and moral injury Goal 3: 30% reduction in negative emotions and moral injury.  [de-identified] : Session focused on loss of father and related stuck points. Pt continued to reflect on termination as writer shared plans for maternity leave last week. Pt stated she will take a break from therapy and focus on PT and her physical health. Writer explored with pt an unfinished topic from this tx, her unresolved feelings and a lingering stuck point about her father's passing. Pt stated she tries not to focus on this, and lately feels more sadness and longing for her late father, noticing how the family is different without him. However the pt was unsure if she had overcome the stuck point or was merely avoiding it. Writer and pt decided that she could try working on it, and HW was assigned to start "Challenging Beliefs" worksheet about this stuck point prior to the next session.  [FreeTextEntry1] : Continue weekly individual therapy. Provide CBT and CPT booster sessions. Continue to monitor sx and measure progress using self-report measures and HW.   Due to writer's vacation next week and the pt's vacation the following week, the next session will be held in 3 weeks.

## 2023-09-14 ENCOUNTER — APPOINTMENT (OUTPATIENT)
Dept: INTERNAL MEDICINE | Facility: CLINIC | Age: 58
End: 2023-09-14

## 2023-09-25 ENCOUNTER — NON-APPOINTMENT (OUTPATIENT)
Age: 58
End: 2023-09-25

## 2023-09-25 DIAGNOSIS — M25.569 PAIN IN UNSPECIFIED KNEE: ICD-10-CM

## 2023-09-26 ENCOUNTER — APPOINTMENT (OUTPATIENT)
Dept: PSYCHIATRY | Facility: CLINIC | Age: 58
End: 2023-09-26

## 2023-10-06 ENCOUNTER — APPOINTMENT (OUTPATIENT)
Dept: PSYCHIATRY | Facility: CLINIC | Age: 58
End: 2023-10-06
Payer: COMMERCIAL

## 2023-10-06 PROCEDURE — 90837 PSYTX W PT 60 MINUTES: CPT | Mod: GT

## 2023-10-13 ENCOUNTER — APPOINTMENT (OUTPATIENT)
Dept: PSYCHIATRY | Facility: CLINIC | Age: 58
End: 2023-10-13
Payer: COMMERCIAL

## 2023-10-13 PROCEDURE — 90837 PSYTX W PT 60 MINUTES: CPT | Mod: GT

## 2023-10-16 ENCOUNTER — NON-APPOINTMENT (OUTPATIENT)
Age: 58
End: 2023-10-16

## 2023-10-16 ENCOUNTER — APPOINTMENT (OUTPATIENT)
Dept: GASTROENTEROLOGY | Facility: CLINIC | Age: 58
End: 2023-10-16
Payer: COMMERCIAL

## 2023-10-16 VITALS
OXYGEN SATURATION: 95 % | HEIGHT: 61 IN | SYSTOLIC BLOOD PRESSURE: 135 MMHG | WEIGHT: 160 LBS | BODY MASS INDEX: 30.21 KG/M2 | HEART RATE: 96 BPM | DIASTOLIC BLOOD PRESSURE: 78 MMHG

## 2023-10-16 DIAGNOSIS — Z12.11 ENCOUNTER FOR SCREENING FOR MALIGNANT NEOPLASM OF COLON: ICD-10-CM

## 2023-10-16 DIAGNOSIS — Z86.010 PERSONAL HISTORY OF COLONIC POLYPS: ICD-10-CM

## 2023-10-16 PROCEDURE — 99214 OFFICE O/P EST MOD 30 MIN: CPT

## 2023-10-27 ENCOUNTER — APPOINTMENT (OUTPATIENT)
Dept: PSYCHIATRY | Facility: CLINIC | Age: 58
End: 2023-10-27
Payer: COMMERCIAL

## 2023-10-27 PROCEDURE — 90837 PSYTX W PT 60 MINUTES: CPT | Mod: GT

## 2023-11-03 ENCOUNTER — APPOINTMENT (OUTPATIENT)
Dept: PSYCHIATRY | Facility: CLINIC | Age: 58
End: 2023-11-03
Payer: COMMERCIAL

## 2023-11-03 PROCEDURE — 90837 PSYTX W PT 60 MINUTES: CPT | Mod: GT

## 2023-11-09 ENCOUNTER — APPOINTMENT (OUTPATIENT)
Dept: PSYCHIATRY | Facility: CLINIC | Age: 58
End: 2023-11-09

## 2023-11-10 ENCOUNTER — APPOINTMENT (OUTPATIENT)
Dept: PSYCHIATRY | Facility: CLINIC | Age: 58
End: 2023-11-10
Payer: COMMERCIAL

## 2023-11-10 DIAGNOSIS — F32.9 MAJOR DEPRESSIVE DISORDER, SINGLE EPISODE, UNSPECIFIED: ICD-10-CM

## 2023-11-10 PROCEDURE — 90837 PSYTX W PT 60 MINUTES: CPT | Mod: GT

## 2023-11-11 PROBLEM — F32.9 MAJOR DEPRESSIVE DISORDER: Status: ACTIVE | Noted: 2022-05-26

## 2023-12-08 ENCOUNTER — TRANSCRIPTION ENCOUNTER (OUTPATIENT)
Age: 58
End: 2023-12-08

## 2023-12-08 ENCOUNTER — OUTPATIENT (OUTPATIENT)
Dept: OUTPATIENT SERVICES | Facility: HOSPITAL | Age: 58
LOS: 1 days | End: 2023-12-08
Payer: COMMERCIAL

## 2023-12-08 ENCOUNTER — RESULT REVIEW (OUTPATIENT)
Age: 58
End: 2023-12-08

## 2023-12-08 ENCOUNTER — APPOINTMENT (OUTPATIENT)
Dept: GASTROENTEROLOGY | Facility: HOSPITAL | Age: 58
End: 2023-12-08

## 2023-12-08 VITALS
DIASTOLIC BLOOD PRESSURE: 72 MMHG | OXYGEN SATURATION: 97 % | HEART RATE: 72 BPM | SYSTOLIC BLOOD PRESSURE: 114 MMHG | RESPIRATION RATE: 18 BRPM

## 2023-12-08 VITALS
HEIGHT: 62 IN | WEIGHT: 160.06 LBS | TEMPERATURE: 98 F | HEART RATE: 79 BPM | SYSTOLIC BLOOD PRESSURE: 111 MMHG | OXYGEN SATURATION: 93 % | DIASTOLIC BLOOD PRESSURE: 61 MMHG | RESPIRATION RATE: 14 BRPM

## 2023-12-08 DIAGNOSIS — R14.0 ABDOMINAL DISTENSION (GASEOUS): ICD-10-CM

## 2023-12-08 DIAGNOSIS — Z84.89 FAMILY HISTORY OF OTHER SPECIFIED CONDITIONS: Chronic | ICD-10-CM

## 2023-12-08 DIAGNOSIS — Z86.010 PERSONAL HISTORY OF COLONIC POLYPS: ICD-10-CM

## 2023-12-08 PROCEDURE — 88305 TISSUE EXAM BY PATHOLOGIST: CPT

## 2023-12-08 PROCEDURE — 88305 TISSUE EXAM BY PATHOLOGIST: CPT | Mod: 26

## 2023-12-08 PROCEDURE — 45385 COLONOSCOPY W/LESION REMOVAL: CPT | Mod: GC

## 2023-12-08 PROCEDURE — 45385 COLONOSCOPY W/LESION REMOVAL: CPT | Mod: PT

## 2023-12-08 DEVICE — NET RETRV ROT ROTH 2.5MMX230CM: Type: IMPLANTABLE DEVICE | Status: FUNCTIONAL

## 2023-12-08 RX ORDER — SODIUM CHLORIDE 9 MG/ML
500 INJECTION INTRAMUSCULAR; INTRAVENOUS; SUBCUTANEOUS
Refills: 0 | Status: DISCONTINUED | OUTPATIENT
Start: 2023-12-08 | End: 2023-12-22

## 2023-12-08 NOTE — ASU DISCHARGE PLAN (ADULT/PEDIATRIC) - FOLLOW UP APPOINTMENTS
[facility], Endoscopy Unit [facility], Endoscopy Unit

## 2023-12-08 NOTE — ASU PATIENT PROFILE, ADULT - FALL HARM RISK - UNIVERSAL INTERVENTIONS
Bed in lowest position, wheels locked, appropriate side rails in place/Call bell, personal items and telephone in reach/Instruct patient to call for assistance before getting out of bed or chair/Non-slip footwear when patient is out of bed/[place] to call system/Physically safe environment - no spills, clutter or unnecessary equipment/Purposeful Proactive Rounding/Room/bathroom lighting operational, light cord in reach Bed in lowest position, wheels locked, appropriate side rails in place/Call bell, personal items and telephone in reach/Instruct patient to call for assistance before getting out of bed or chair/Non-slip footwear when patient is out of bed/[place] to call system/Physically safe environment - no spills, clutter or unnecessary equipment/Purposeful Proactive Rounding/Room/bathroom lighting operational, light cord in reach

## 2023-12-08 NOTE — ASU DISCHARGE PLAN (ADULT/PEDIATRIC) - NS MD DC FALL RISK RISK
For information on Fall & Injury Prevention, visit: [email or web address] OR  [email or web address] OR  https://www.cdc.gov/steadi/patient.html For information on Fall & Injury Prevention, visit: [email or web address] OR  [email or web address] OR  https://www.cdc.gov/steadi/patient.html

## 2023-12-08 NOTE — PRE PROCEDURE NOTE - PRE PROCEDURE EVALUATION
Attending Physician:             Perlita Perez               Procedure: colonoscopy    Indication for Procedure: hx of adenomatous colon polyp  ________________________________________________________  PAST MEDICAL & SURGICAL HISTORY:  HLD (hyperlipidemia)      Other depression      Endometrial polyp      Family history of uterine fibroid        ALLERGIES:  No Known Allergies    HOME MEDICATIONS:  Crestor 10 mg oral tablet: 1 orally once a day  ibuprofen 600 mg oral tablet: 1 orally every 6 hours as needed for  moderate pain  sertraline 100 mg oral tablet: 3 orally once a day  Tylenol 325 mg oral tablet: 3 orally every 6 hours as needed for  moderate pain alternate with ibuprofen (switch between each medication every 3 hours)  Xanax 0.5 mg oral tablet: 1 orally once a day (at bedtime)    AICD/PPM: [ ] yes   [ ] no    PERTINENT LAB DATA:                      PHYSICAL EXAMINATION:    Height (cm): 157.5  Weight (kg): 72.6  BMI (kg/m2): 29.3  BSA (m2): 1.74T(C): 36.7  HR: 79  BP: 111/61  RR: 14  SpO2: 93%    Constitutional: NAD  HEENT: PERRLA, EOMI,    Neck:  No JVD  Respiratory: CTAB/L  Cardiovascular: S1 and S2  Gastrointestinal: BS+, soft, NT/ND  Extremities: No peripheral edema  Neurological: A/O x 3, no focal deficits  Psychiatric: Normal mood, normal affect  Skin: No rashes    ASA Class: I [ ]  II [ ]  III [ ]  IV [ ]    COMMENTS:    The patient is a suitable candidate for the planned procedure unless box checked [ ]  No, explain:

## 2023-12-14 LAB
SURGICAL PATHOLOGY STUDY: SIGNIFICANT CHANGE UP
SURGICAL PATHOLOGY STUDY: SIGNIFICANT CHANGE UP

## 2023-12-19 DIAGNOSIS — Z20.828 CONTACT WITH AND (SUSPECTED) EXPOSURE TO OTHER VIRAL COMMUNICABLE DISEASES: ICD-10-CM

## 2023-12-30 NOTE — ASU PATIENT PROFILE, ADULT - MEDICATION HERBAL REMEDIES, PROFILE
For associated back pain, muscle aches, and muscle spasms please consider using an over the counter topical cooling gel such as Biofreeze or capsaicin cream (icy/hot).   Or you can also consider using a natural cream/gel containing Arnica (available in various brands at [employer], [employer] or chiropractor offices) for muscle spasms and inflammation.  May also try over the counter SalonPas lidocaine patches.  Apply 1 patch to the affected area for for 12 hours and then remove for 12 hours.  Do not place a patch over the topical gels, creams or lotions.  You may apply ice or heat for 15 to 20 minutes every 4 hours to help ease the pain and stiffness.   Please continue to perform intermittent stretching of affected area. Avoid prolonged standing or sitting for extended periods of time. Change positions frequently. Complete bed rest is not recommended.   Acetaminophen every 6-8 hours as needed for pain/fever. (Do not take these medications if you have medical reasons to avoid them). Do not take anti-inflammatories for more than 5 days in a row.   Referral to physical therapy was made. Please stop at the  to schedule an appointment.    Follow-up with primary care provider in 3-5 days for recheck.  Follow-up sooner symptoms worsen or new symptoms develop.  Go to the emergency room immediately if you experience numbness/tingling of the extremities, lower extremity weakness, a sharp pain that suddenly shoots down the leg, fever of 100.4° F or higher, you have numbness of the genitals, urinary retention/incontinence or stool incontinence.    [facility]    Dr. Vicente Tejada MD  [address] [address]  [phone]    [facility]  [address], [address]  [facility] [address]  [phone]    -----  Dr. Carlos Garcia MD (neurosurgery + spine surgery)  [facility]  [place]  [address] [place] [address]  [phone]    -----  Dr. Enrique Davison MD  [facility]  [address], [address]  [facility] [address]  [phone]    -----  Dr. Aaron Crane MD  [facility] [facility] [place])  [address] [place] [address]  [phone]    ----  [facility]    Dr. Trent Sanz MD -- Spine Physiatry, Physical Rehabilitation & Therapy  [address]  [phone]    *-*-*-*-*-*-*-  [facility]    Monday - Thursday 8 a.m. - 8 p.m.  Friday                        8 a.m. - 8 p.m.    Saturday (and holidays) 8 a.m. - 4 p.m.  Sunday                                     Closed  *-*-*-*-*-*-*-  [facility] We Have New Hours on Fridays, we are now open til 8pm    [email or web address]  See current wait times for [facility] in real-time!  Reserve your waiting-room spot in line!   Receive text/email messages if our wait times are long,  so that you can do your waiting at home or work, instead of in our waiting room.     Note: This system does not guarantee an \"appointment time\" to see a provider. Also, patients may be called out of the waiting room \"ahead of turn\" in emergency situations, at discretion of Urgent Care staff.  ----------------  Thank you for choosing [facility] today. We hope you had a pleasant experience and we look forward to serving your future needs.    If you have any questions about your VISIT, please call [phone].    If you have any questions about your BILL, please call [phone].    If you need a copy of your MEDICAL RECORD, please call [phone].    If you receive a survey in the mail about today's services, we hope that you will take a few minutes to let us know about your  experience.  --------------------------------------------------------------------------------------------------------------  UNLESS OTHERWISE INSTRUCTED BY YOUR URGENT CARE PROVIDER TODAY, all follow-up for your medical issues should be managed by your primary care provider. The Urgent Care does not manage chronic medical issues or refill medications. You are responsible for scheduling and keeping any necessary follow-up visits with your primary care provider after this visit today.   --------------------------------------------------------------------------------------------------------------  IF YOU WERE PRESCRIBED AN ANTIBIOTIC TODAY: We recommend taking an over-the-counter probiotic (Such as Florajen 3 for adults, or [id number] for children -- AVAILABLE IN THE [facility] and other local pharmacies too) once a day for the entire duration of your antibiotics, and continuing it for 2 weeks after the antibiotics are finished. This will help reduce your chance of developing antibiotic-related diarrhea and/or yeast infections.  --------------------------------------------------------------------------------------------------------------                    no

## 2024-01-28 ENCOUNTER — TRANSCRIPTION ENCOUNTER (OUTPATIENT)
Age: 59
End: 2024-01-28

## 2024-01-28 RX ORDER — CLINDAMYCIN PHOSPHATE 10 MG/ML
1 LOTION TOPICAL
Qty: 1 | Refills: 2 | Status: ACTIVE | COMMUNITY
Start: 2023-08-22 | End: 1900-01-01

## 2024-01-28 RX ORDER — CHLORHEXIDINE GLUCONATE 213 G/1000ML
4 SOLUTION TOPICAL
Qty: 1 | Refills: 5 | Status: ACTIVE | COMMUNITY
Start: 2023-08-22 | End: 1900-01-01

## 2024-02-21 ENCOUNTER — RX RENEWAL (OUTPATIENT)
Age: 59
End: 2024-02-21

## 2024-02-21 RX ORDER — ROSUVASTATIN CALCIUM 10 MG/1
10 TABLET, FILM COATED ORAL
Qty: 90 | Refills: 2 | Status: ACTIVE | COMMUNITY
Start: 2017-12-19 | End: 1900-01-01

## 2024-05-22 RX ORDER — SODIUM PICOSULFATE, MAGNESIUM OXIDE, AND ANHYDROUS CITRIC ACID 10; 3.5; 12 MG/160ML; G/160ML; G/160ML
10-3.5-12 MG-GM LIQUID ORAL
Qty: 1 | Refills: 0 | Status: COMPLETED | COMMUNITY
Start: 2023-10-16 | End: 2024-05-22

## 2024-05-22 RX ORDER — CEFADROXIL 500 MG/1
500 CAPSULE ORAL TWICE DAILY
Qty: 14 | Refills: 0 | Status: COMPLETED | COMMUNITY
Start: 2022-10-17 | End: 2024-05-22

## 2024-05-22 RX ORDER — NIRMATRELVIR AND RITONAVIR 300-100 MG
20 X 150 MG & KIT ORAL
Qty: 1 | Refills: 0 | Status: COMPLETED | COMMUNITY
Start: 2023-12-19 | End: 2024-05-22

## 2024-05-22 RX ORDER — OSELTAMIVIR PHOSPHATE 75 MG/1
75 CAPSULE ORAL
Qty: 10 | Refills: 0 | Status: COMPLETED | COMMUNITY
Start: 2023-12-19 | End: 2024-05-22

## 2024-06-16 DIAGNOSIS — Z00.00 ENCOUNTER FOR GENERAL ADULT MEDICAL EXAMINATION W/OUT ABNORMAL FINDINGS: ICD-10-CM

## 2024-06-17 LAB
25(OH)D3 SERPL-MCNC: 27.8 NG/ML
ALBUMIN SERPL ELPH-MCNC: 4.7 G/DL
ALP BLD-CCNC: 70 U/L
ALT SERPL-CCNC: 19 U/L
ANION GAP SERPL CALC-SCNC: 13 MMOL/L
AST SERPL-CCNC: 20 U/L
BASOPHILS # BLD AUTO: 0.08 K/UL
BASOPHILS NFR BLD AUTO: 0.7 %
BILIRUB SERPL-MCNC: 0.3 MG/DL
BUN SERPL-MCNC: 15 MG/DL
CALCIUM SERPL-MCNC: 9.7 MG/DL
CHLORIDE SERPL-SCNC: 102 MMOL/L
CHOLEST SERPL-MCNC: 168 MG/DL
CO2 SERPL-SCNC: 22 MMOL/L
CREAT SERPL-MCNC: 0.48 MG/DL
EGFR: 110 ML/MIN/1.73M2
EOSINOPHIL # BLD AUTO: 0.08 K/UL
EOSINOPHIL NFR BLD AUTO: 0.7 %
ESTIMATED AVERAGE GLUCOSE: 143 MG/DL
GLUCOSE SERPL-MCNC: 145 MG/DL
HBA1C MFR BLD HPLC: 6.6 %
HCT VFR BLD CALC: 42.4 %
HDLC SERPL-MCNC: 49 MG/DL
HGB BLD-MCNC: 14.3 G/DL
IMM GRANULOCYTES NFR BLD AUTO: 0.3 %
LDLC SERPL CALC-MCNC: 97 MG/DL
LYMPHOCYTES # BLD AUTO: 2.45 K/UL
LYMPHOCYTES NFR BLD AUTO: 22.9 %
MAN DIFF?: NORMAL
MCHC RBC-ENTMCNC: 29.4 PG
MCHC RBC-ENTMCNC: 33.7 GM/DL
MCV RBC AUTO: 87.1 FL
MONOCYTES # BLD AUTO: 0.46 K/UL
MONOCYTES NFR BLD AUTO: 4.3 %
NEUTROPHILS # BLD AUTO: 7.58 K/UL
NEUTROPHILS NFR BLD AUTO: 71.1 %
NONHDLC SERPL-MCNC: 118 MG/DL
PLATELET # BLD AUTO: 244 K/UL
POTASSIUM SERPL-SCNC: 4.3 MMOL/L
PROT SERPL-MCNC: 7.4 G/DL
RBC # BLD: 4.87 M/UL
RBC # FLD: 13 %
SODIUM SERPL-SCNC: 136 MMOL/L
TRIGL SERPL-MCNC: 115 MG/DL
TSH SERPL-ACNC: 1.59 UIU/ML
WBC # FLD AUTO: 10.68 K/UL

## 2024-06-18 RX ORDER — SEMAGLUTIDE 0.68 MG/ML
2 INJECTION, SOLUTION SUBCUTANEOUS
Qty: 1 | Refills: 3 | Status: ACTIVE | COMMUNITY
Start: 2024-06-18 | End: 1900-01-01

## 2024-07-18 ENCOUNTER — NON-APPOINTMENT (OUTPATIENT)
Age: 59
End: 2024-07-18

## 2024-09-09 NOTE — H&P PST ADULT - HISTORY OF PRESENT ILLNESS
Patient ID: Larissa is a 43 year old female.    Larissa accepted a chaperone.    Chief Complaint   Patient presents with   • Fibroids     Consult      HPI: She continues to have issues with premenstrual tension.  Menses makes her very tired both before and after they are done.  She becomes emotional as well.  She has dyspareunia as well.  She has a known uterine fibroid which is relatively small.  She did not take the Slynd because of fears that it might affect \"Pots\" (postural orthostatic tachycardia syndrome).   She has a lot of inflammation in her joints which she feels gets worse right before her menses.  This also seems to be sporadic.  She does have dyspareunia as well.    Patient wants to know if having fibroid ablation helpful for this issue also with her not her hormone levels will be affected.  She does not want to have endometrial ablation because of concerns for infertility.  She has not sought infertility treatment.  Her pain generally is on the left-hand side in her lower pelvis.  She states that her pain does not quite that bad.      Patient's medications, allergies, past medical, surgical, social and family histories were reviewed and updated as appropriate.    Past Medical History:   Diagnosis Date   • Acne vulgaris    • Anxiety    • Anxiety disorder    • Francois esophagus    • Bronchitis    • Candidiasis of vulva and vagina    • COVID-19 12/2022   • Diseases of lips    • Esophagitis    • Fatty (change of) liver, not elsewhere classified    • GERD (gastroesophageal reflux disease)    • Gluteal tendinitis    • Hair loss    • HLD (hyperlipidemia)    • Stacey's syndrome 2010   • HTN (hypertension)    • Hypercalcemia    • Hyperparathyroidism  (CMD)    • Hypothyroidism    • Kidney stone    • OA (osteoarthritis)    • Parathyroid neoplasm    • Peripheral neuropathy    • Plantar fascial fibromatosis    • POTS (postural orthostatic tachycardia syndrome)    • Ptosis of eyelid, right    • Seasonal allergies    •  Solitary pulmonary nodule    • Tachycardia    • Thyroid disease     since birth   • Urinary urgency      Family History   Problem Relation Age of Onset   • Congestive Heart Failure Mother    • COPD Mother    • Thyroid Mother    • Pulmonary embolism Mother    • Rheumatoid Arthritis Mother    • Osteoarthritis Mother    • Mitral valve prolapse Mother    • Myocardial Infarction Mother 56   • Hypertension Father    • Diabetes Father    • Cancer Father         bladder cancer   • Aneurysm Father    • Dementia/Alzheimers Father    • Kidney disease Father    • Thyroid Brother    • Heart disease Maternal Grandmother    • Cancer, Colon Paternal Grandmother    • Cancer Paternal Grandfather    • Cancer, Colon Paternal Aunt    • Cancer, Stomach Paternal Uncle      Current Outpatient Medications   Medication Sig Dispense Refill   • levothyroxine 125 MCG tablet TAKE 1 TABLET BY MOUTH DAILY IN THE MORNING ON AN EMPTY STOMACH 30 tablet 0   • pantoprazole (PROTONIX) 40 MG tablet Take 1 tablet by mouth daily. 30 tablet 1   • azelaic acid (FINACEA) 15 % gel Apply topically 2 times daily. 50 g 0   • ondansetron (ZOFRAN ODT) 4 MG disintegrating tablet Place 1 tablet onto the tongue every 6 hours. 10 tablet 0   • metoPROLOL succinate (TOPROL-XL) 50 MG 24 hr tablet Take 1 tablet by mouth in the morning and 1 tablet in the evening. 180 tablet 3   • fluticasone (FLONASE) 50 MCG/ACT nasal spray Spray 2 sprays in each nostril daily. (Patient not taking: Reported on 9/9/2024) 16 g 1   • Drospirenone 4 MG Tab Take 1 tablet by mouth daily. Indications: Birth Control Treatment, Patient cannot take estrogen containing pills because of smoking history.  Norethindrone may make her periods more irregular. (Patient not taking: Reported on 9/9/2024) 84 tablet 3     No current facility-administered medications for this visit.     Past Surgical History:   Procedure Laterality Date   • Dexa bone density axial skeleton  10/16/2022   •  Esophagogastroduodenoscopy (egd)  10/18/2021   • Esophagogastroduodenoscopy transoral flex diag  2020    Francois's   • Mammo screening bilateral  10/12/2023   • Parathyroidectomy  2022   • Thyroid lobectomy,unilat  2022    2 surgeries   • Umbilical hernia repair      small child     ALLERGIES:   Allergen Reactions   • Ciprofloxacin Other (See Comments)   • Sulfa Antibiotics RASH and Other (See Comments)   • Adhesive   (Environmental) Other (See Comments)   • Bupropion RASH   • Plexion Cleanser Other (See Comments)     Social History     Tobacco Use   Smoking Status Every Day   • Current packs/day: 1.00   • Types: Cigarettes   • Passive exposure: Current   Smokeless Tobacco Never     Immunization History   Administered Date(s) Administered   • COVID Pfizer 12Y+ (Requires Dilution) 2021, 2021, 10/11/2021     Patient Active Problem List   Diagnosis   • Viral syndrome   • Palpitations   • Tachycardia   • HTN (hypertension)   • Urinary (tract) obstruction   • Bronchitis   • Cough   • Hyperparathyroidism  (CMD)   • Hypothyroidism   • Kidney stone   • Left hip pain   • Primary localized osteoarthritis of left hip   • Primary localized osteoarthritis of pelvic region and thigh   • Tobacco user   • Sinus tachycardia   • Primary osteoarthritis of right hip   • Chest pain in adult   • Bilateral lower extremity edema   • Menorrhagia with regular cycle   • Leiomyoma of body of uterus   • Dyspareunia, female   • Rosacea   • Generalized anxiety disorder   • Hormone imbalance   • Pelvic pain in female   • Premenstrual tension syndrome     OB History    Para Term  AB Living   0 0 0 0 0 0   SAB IAB Ectopic Molar Multiple Live Births   0 0 0 0 0 0     Review of Systems   Constitutional: Negative.    Gastrointestinal:  Negative for constipation.   Genitourinary:  Positive for dyspareunia, menstrual problem and pelvic pain.   Psychiatric/Behavioral:  Positive for dysphoric mood.      Visit  Vitals  /82   Pulse 81   Ht 5' 8\" (1.727 m)   Wt 100.8 kg (222 lb 1.8 oz)   LMP 08/25/2024 (Approximate)   SpO2 96%   BMI 33.77 kg/m²     Physical Exam  Constitutional:       Appearance: Normal appearance. She is obese.   Genitourinary:      Vulva, bladder and urethral meatus normal.      Right Labia: No tenderness.     Left Labia: No tenderness or lesions.     No vaginal discharge, tenderness or bleeding.        Left Adnexa: tender (Mild).     Cervix is nulliparous.      No cervical motion tenderness.      Uterus is irregular (Slightly irregular anteriorly.).      Uterus is not enlarged, fixed or tender.      Uterus is retroverted.      No urethral tenderness present.      Pelvic exam was performed with patient in the lithotomy position and normal support.   Abdominal:      General: There is no distension.      Palpations: There is no mass.      Tenderness: There is no abdominal tenderness. There is no guarding or rebound.      Hernia: No hernia is present.   Neurological:      General: No focal deficit present.      Mental Status: She is alert and oriented to person, place, and time.   Skin:     General: Skin is warm and dry.   Psychiatric:         Mood and Affect: Mood normal.         Behavior: Behavior normal.         Thought Content: Thought content normal.         Judgment: Judgment normal.   Vitals reviewed. Exam conducted with a chaperone present.     Examination today is somewhat limited because her bladder is quite full  Problem List Items Addressed This Visit        Endocrine and Metabolic    Hormone imbalance       Gastrointestinal and Abdominal    Pelvic pain in female       Genitourinary and Reproductive    Premenstrual tension syndrome       Hematology and Neoplasia    Leiomyoma of body of uterus - Primary   Other Visit Diagnoses     Encounter for screening mammogram for high-risk patient        Relevant Orders    MAMMO SCREENING BILATERAL W RODOLFO        Plan:   1.  I reviewed the pros and cons  of getting hormone levels to try to diagnosis better.  I told her that her hormone levels are not generally very good to get because they could be very variable depending on what part of her wrist menstrual cycle you take them.  That is why the clinical presentation for her symptoms are what is important to try to treat.  Her issues tend to be about the time 3 days before and 3 days after her menses begins.  I really recommended going on the Slynd when her menses starts and seeing how she does on that over the next 3 months.  I do not believe that this will interfere with her POTS syndrome.  Also taking an SSRI 10 days prior to her menses may also help.    2.  Uterine fibroid.  I do not believe this is what is causing her pain necessarily.  It is very possible she has adenomyosis although this is more of a diagnosis of exclusion.  I reviewed her ultrasound again which does show an anterior fibroid and the uterus appears somewhat heterogenous to me which may be associated with adenomyosis.    She states that she will try the Slynd which I told her to begin on the first day of her menses.  She should see an infertility specialist if she really wants to get pregnant and a referral was presented to her.    I did tell her that proceeding with a Sonata procedure could possibly help her pain symptoms but was not a guarantee.  Myomectomy was also presented as an option.  Hormonal treatments such as Lupron and Orilissa as well.  Help to shrink fibroids.  Follow-up in 8 weeks or so.          Rafa Harper MD, FACOG   58 y/o F with pmhx of HLD, Depression, Endometrial Polyp, presents to [facility] for pre-procedure evaluation. Patient reports diagnosed with large endometrial polyp. Patient is scheduled to have Diagnostic Hysteroscopy, D&C, Polypectomy, Possible Operative Hysterectomy on 05/15/2023

## 2024-10-15 ENCOUNTER — APPOINTMENT (OUTPATIENT)
Dept: INTERNAL MEDICINE | Facility: CLINIC | Age: 59
End: 2024-10-15
Payer: COMMERCIAL

## 2024-10-15 VITALS
SYSTOLIC BLOOD PRESSURE: 111 MMHG | TEMPERATURE: 98.8 F | HEIGHT: 61 IN | WEIGHT: 153 LBS | BODY MASS INDEX: 28.89 KG/M2 | HEART RATE: 86 BPM | OXYGEN SATURATION: 95 % | DIASTOLIC BLOOD PRESSURE: 68 MMHG

## 2024-10-15 DIAGNOSIS — Z00.00 ENCOUNTER FOR GENERAL ADULT MEDICAL EXAMINATION W/OUT ABNORMAL FINDINGS: ICD-10-CM

## 2024-10-15 PROCEDURE — 99396 PREV VISIT EST AGE 40-64: CPT

## 2024-10-15 PROCEDURE — 36415 COLL VENOUS BLD VENIPUNCTURE: CPT

## 2024-10-16 DIAGNOSIS — R82.998 OTHER ABNORMAL FINDINGS IN URINE: ICD-10-CM

## 2024-10-16 LAB
ALBUMIN SERPL ELPH-MCNC: 4.7 G/DL
ALP BLD-CCNC: 60 U/L
ALT SERPL-CCNC: 12 U/L
ANION GAP SERPL CALC-SCNC: 13 MMOL/L
APPEARANCE: ABNORMAL
AST SERPL-CCNC: 15 U/L
BACTERIA: ABNORMAL /HPF
BILIRUB SERPL-MCNC: 0.2 MG/DL
BILIRUBIN URINE: NEGATIVE
BLOOD URINE: NEGATIVE
BUN SERPL-MCNC: 15 MG/DL
CALCIUM OXALATE CRYSTALS: PRESENT
CALCIUM SERPL-MCNC: 9.4 MG/DL
CAST: NORMAL /LPF
CHLORIDE SERPL-SCNC: 103 MMOL/L
CHOLEST SERPL-MCNC: 125 MG/DL
CO2 SERPL-SCNC: 23 MMOL/L
COLOR: YELLOW
CREAT SERPL-MCNC: 0.57 MG/DL
EGFR: 105 ML/MIN/1.73M2
EPITHELIAL CELLS: 10 /HPF
ESTIMATED AVERAGE GLUCOSE: 123 MG/DL
GLUCOSE QUALITATIVE U: NEGATIVE MG/DL
GLUCOSE SERPL-MCNC: 88 MG/DL
HBA1C MFR BLD HPLC: 5.9 %
HCT VFR BLD CALC: 42.5 %
HDLC SERPL-MCNC: 40 MG/DL
HGB BLD-MCNC: 14 G/DL
KETONES URINE: NEGATIVE MG/DL
LDLC SERPL CALC-MCNC: 64 MG/DL
LEUKOCYTE ESTERASE URINE: ABNORMAL
MCHC RBC-ENTMCNC: 29.8 PG
MCHC RBC-ENTMCNC: 32.9 GM/DL
MCV RBC AUTO: 90.4 FL
MICROSCOPIC-UA: NORMAL
NITRITE URINE: NEGATIVE
NONHDLC SERPL-MCNC: 85 MG/DL
PH URINE: 5.5
PLATELET # BLD AUTO: 263 K/UL
POTASSIUM SERPL-SCNC: 4 MMOL/L
PROT SERPL-MCNC: 7.4 G/DL
PROTEIN URINE: NEGATIVE MG/DL
RBC # BLD: 4.7 M/UL
RBC # FLD: 13.2 %
RED BLOOD CELLS URINE: NORMAL /HPF
REVIEW: NORMAL
SODIUM SERPL-SCNC: 138 MMOL/L
SPECIFIC GRAVITY URINE: 1.02
TRIGL SERPL-MCNC: 112 MG/DL
TSH SERPL-ACNC: 1.09 UIU/ML
UROBILINOGEN URINE: 1 MG/DL
VIT B12 SERPL-MCNC: 547 PG/ML
WBC # FLD AUTO: 9.77 K/UL
WHITE BLOOD CELLS URINE: 4 /HPF

## 2024-10-17 ENCOUNTER — NON-APPOINTMENT (OUTPATIENT)
Age: 59
End: 2024-10-17

## 2024-10-17 VITALS — WEIGHT: 153 LBS | BODY MASS INDEX: 28.89 KG/M2 | HEIGHT: 61 IN

## 2024-10-17 DIAGNOSIS — Z72.0 TOBACCO USE: ICD-10-CM

## 2024-10-18 ENCOUNTER — APPOINTMENT (OUTPATIENT)
Dept: CT IMAGING | Facility: IMAGING CENTER | Age: 59
End: 2024-10-18
Payer: COMMERCIAL

## 2024-10-18 ENCOUNTER — APPOINTMENT (OUTPATIENT)
Dept: MAMMOGRAPHY | Facility: IMAGING CENTER | Age: 59
End: 2024-10-18
Payer: COMMERCIAL

## 2024-10-18 ENCOUNTER — OUTPATIENT (OUTPATIENT)
Dept: OUTPATIENT SERVICES | Facility: HOSPITAL | Age: 59
LOS: 1 days | End: 2024-10-18
Payer: COMMERCIAL

## 2024-10-18 ENCOUNTER — APPOINTMENT (OUTPATIENT)
Dept: ULTRASOUND IMAGING | Facility: IMAGING CENTER | Age: 59
End: 2024-10-18
Payer: COMMERCIAL

## 2024-10-18 DIAGNOSIS — Z84.89 FAMILY HISTORY OF OTHER SPECIFIED CONDITIONS: Chronic | ICD-10-CM

## 2024-10-18 DIAGNOSIS — Z12.2 ENCOUNTER FOR SCREENING FOR MALIGNANT NEOPLASM OF RESPIRATORY ORGANS: ICD-10-CM

## 2024-10-18 PROCEDURE — 76641 ULTRASOUND BREAST COMPLETE: CPT | Mod: 26,50

## 2024-10-18 PROCEDURE — 77063 BREAST TOMOSYNTHESIS BI: CPT | Mod: 26

## 2024-10-18 PROCEDURE — 76641 ULTRASOUND BREAST COMPLETE: CPT

## 2024-10-18 PROCEDURE — 77067 SCR MAMMO BI INCL CAD: CPT

## 2024-10-18 PROCEDURE — 71271 CT THORAX LUNG CANCER SCR C-: CPT | Mod: 26

## 2024-10-18 PROCEDURE — 77067 SCR MAMMO BI INCL CAD: CPT | Mod: 26

## 2024-10-18 PROCEDURE — 71271 CT THORAX LUNG CANCER SCR C-: CPT

## 2024-10-18 PROCEDURE — 77063 BREAST TOMOSYNTHESIS BI: CPT

## 2024-10-24 ENCOUNTER — RESULT REVIEW (OUTPATIENT)
Age: 59
End: 2024-10-24

## 2024-11-20 ENCOUNTER — TRANSCRIPTION ENCOUNTER (OUTPATIENT)
Age: 59
End: 2024-11-20

## 2024-12-03 ENCOUNTER — TRANSCRIPTION ENCOUNTER (OUTPATIENT)
Age: 59
End: 2024-12-03

## 2025-02-05 RX ORDER — ERYTHROMYCIN 5 MG/G
5 OINTMENT OPHTHALMIC
Qty: 1 | Refills: 0 | Status: ACTIVE | COMMUNITY
Start: 2025-02-05 | End: 1900-01-01

## 2025-02-06 DIAGNOSIS — H00.019 HORDEOLUM EXTERNUM UNSPECIFIED EYE, UNSPECIFIED EYELID: ICD-10-CM

## 2025-02-06 RX ORDER — CEFADROXIL 500 MG/1
500 CAPSULE ORAL TWICE DAILY
Qty: 14 | Refills: 0 | Status: ACTIVE | COMMUNITY
Start: 2025-02-06 | End: 1900-01-01

## 2025-02-14 ENCOUNTER — TRANSCRIPTION ENCOUNTER (OUTPATIENT)
Age: 60
End: 2025-02-14

## 2025-02-24 DIAGNOSIS — M54.50 LOW BACK PAIN, UNSPECIFIED: ICD-10-CM

## 2025-02-24 RX ORDER — TIZANIDINE 2 MG/1
2 TABLET ORAL
Qty: 20 | Refills: 1 | Status: ACTIVE | COMMUNITY
Start: 2025-02-24 | End: 1900-01-01

## 2025-03-27 ENCOUNTER — TRANSCRIPTION ENCOUNTER (OUTPATIENT)
Age: 60
End: 2025-03-27

## 2025-03-28 ENCOUNTER — TRANSCRIPTION ENCOUNTER (OUTPATIENT)
Age: 60
End: 2025-03-28

## 2025-04-14 ENCOUNTER — TRANSCRIPTION ENCOUNTER (OUTPATIENT)
Age: 60
End: 2025-04-14

## 2025-05-05 ENCOUNTER — TRANSCRIPTION ENCOUNTER (OUTPATIENT)
Age: 60
End: 2025-05-05

## 2025-05-13 ENCOUNTER — TRANSCRIPTION ENCOUNTER (OUTPATIENT)
Age: 60
End: 2025-05-13

## 2025-08-19 ENCOUNTER — TRANSCRIPTION ENCOUNTER (OUTPATIENT)
Age: 60
End: 2025-08-19

## (undated) DEVICE — TUBING CAP SET ENDO 24HR USE GI

## (undated) DEVICE — BIOPSY FORCEP RADIAL JAW 4 STANDARD WITH NEEDLE

## (undated) DEVICE — ELCTR GROUNDING PAD ADULT COVIDIEN

## (undated) DEVICE — CATH IV SAFE BC 22G X 1" (BLUE)

## (undated) DEVICE — POLY TRAP ETRAP

## (undated) DEVICE — TUBING IV SET GRAVITY 3Y 100" MACRO

## (undated) DEVICE — SOL IRR GLYCINE 1.5% 3000L

## (undated) DEVICE — ELCTR HF RESECTION LOOP ANGLED 22.5FR

## (undated) DEVICE — SENSOR O2 FINGER ADULT

## (undated) DEVICE — POSITIONER FOAM EGG CRATE ULNAR 2PCS (PINK)

## (undated) DEVICE — SYR LUER LOK 50CC

## (undated) DEVICE — PACK LITHOTOMY

## (undated) DEVICE — CATH IV SAFE BC 20G X 1.16" (PINK)

## (undated) DEVICE — POSITIONER PATIENT SAFETY STRAP 3X60"

## (undated) DEVICE — CLAMP BX HOT RAD JAW 3

## (undated) DEVICE — FOLEY HOLDER STATLOCK 2 WAY ADULT

## (undated) DEVICE — SOL IRR POUR NS 0.9% 500ML

## (undated) DEVICE — Device

## (undated) DEVICE — WARMING BLANKET UPPER ADULT

## (undated) DEVICE — SUCTION YANKAUER NO CONTROL VENT

## (undated) DEVICE — DRAPE LIGHT HANDLE COVER (GREEN)

## (undated) DEVICE — PACK IV START WITH CHG

## (undated) DEVICE — GLV 7.5 PROTEXIS (WHITE)

## (undated) DEVICE — PRESSURE INFUSOR BAG 1000ML

## (undated) DEVICE — TUBING SUCTION 20FT

## (undated) DEVICE — TUBING SUCTION CONN 6FT STERILE

## (undated) DEVICE — SOL IRR BAG NS 0.9% 1000ML

## (undated) DEVICE — DRAPE 1/2 SHEET 40X57"

## (undated) DEVICE — AVETA FLUID MANAGEMENT ACCESSORY

## (undated) DEVICE — IRRIGATOR BIO SHIELD

## (undated) DEVICE — FORCEP RADIAL JAW 4 JUMBO 2.8MM 3.2MM 240CM ORANGE DISP

## (undated) DEVICE — BRUSH COLONOSCOPY CYTOLOGY

## (undated) DEVICE — TUBING FLUID ADMINISTRATION SET PRIM 70"

## (undated) DEVICE — SOL INJ NS 0.9% 500ML 2 PORT